# Patient Record
Sex: FEMALE | Race: WHITE
[De-identification: names, ages, dates, MRNs, and addresses within clinical notes are randomized per-mention and may not be internally consistent; named-entity substitution may affect disease eponyms.]

---

## 2018-06-16 ENCOUNTER — HOSPITAL ENCOUNTER (INPATIENT)
Dept: HOSPITAL 17 - PHED | Age: 83
LOS: 3 days | Discharge: HOME | DRG: 872 | End: 2018-06-19
Attending: HOSPITALIST | Admitting: HOSPITALIST
Payer: MEDICARE

## 2018-06-16 VITALS
RESPIRATION RATE: 20 BRPM | DIASTOLIC BLOOD PRESSURE: 42 MMHG | HEART RATE: 82 BPM | OXYGEN SATURATION: 95 % | SYSTOLIC BLOOD PRESSURE: 103 MMHG

## 2018-06-16 VITALS
HEART RATE: 88 BPM | RESPIRATION RATE: 18 BRPM | DIASTOLIC BLOOD PRESSURE: 35 MMHG | TEMPERATURE: 98.5 F | OXYGEN SATURATION: 98 % | SYSTOLIC BLOOD PRESSURE: 87 MMHG

## 2018-06-16 VITALS
HEART RATE: 105 BPM | SYSTOLIC BLOOD PRESSURE: 112 MMHG | RESPIRATION RATE: 18 BRPM | OXYGEN SATURATION: 96 % | TEMPERATURE: 99.5 F | DIASTOLIC BLOOD PRESSURE: 48 MMHG

## 2018-06-16 VITALS — OXYGEN SATURATION: 98 %

## 2018-06-16 VITALS
DIASTOLIC BLOOD PRESSURE: 41 MMHG | RESPIRATION RATE: 13 BRPM | OXYGEN SATURATION: 98 % | HEART RATE: 84 BPM | SYSTOLIC BLOOD PRESSURE: 95 MMHG

## 2018-06-16 VITALS
OXYGEN SATURATION: 94 % | RESPIRATION RATE: 16 BRPM | SYSTOLIC BLOOD PRESSURE: 104 MMHG | DIASTOLIC BLOOD PRESSURE: 50 MMHG | HEART RATE: 89 BPM

## 2018-06-16 VITALS
SYSTOLIC BLOOD PRESSURE: 85 MMHG | HEART RATE: 82 BPM | DIASTOLIC BLOOD PRESSURE: 44 MMHG | OXYGEN SATURATION: 95 % | RESPIRATION RATE: 22 BRPM

## 2018-06-16 VITALS
HEART RATE: 93 BPM | DIASTOLIC BLOOD PRESSURE: 49 MMHG | SYSTOLIC BLOOD PRESSURE: 100 MMHG | RESPIRATION RATE: 16 BRPM | OXYGEN SATURATION: 96 %

## 2018-06-16 VITALS — HEART RATE: 90 BPM

## 2018-06-16 VITALS
DIASTOLIC BLOOD PRESSURE: 38 MMHG | RESPIRATION RATE: 15 BRPM | TEMPERATURE: 98.5 F | SYSTOLIC BLOOD PRESSURE: 84 MMHG | HEART RATE: 86 BPM | OXYGEN SATURATION: 98 %

## 2018-06-16 VITALS
HEART RATE: 86 BPM | OXYGEN SATURATION: 94 % | SYSTOLIC BLOOD PRESSURE: 98 MMHG | DIASTOLIC BLOOD PRESSURE: 44 MMHG | RESPIRATION RATE: 20 BRPM

## 2018-06-16 VITALS
OXYGEN SATURATION: 96 % | DIASTOLIC BLOOD PRESSURE: 39 MMHG | HEART RATE: 88 BPM | SYSTOLIC BLOOD PRESSURE: 89 MMHG | RESPIRATION RATE: 21 BRPM

## 2018-06-16 VITALS — HEART RATE: 86 BPM

## 2018-06-16 VITALS
DIASTOLIC BLOOD PRESSURE: 43 MMHG | TEMPERATURE: 99.9 F | RESPIRATION RATE: 18 BRPM | SYSTOLIC BLOOD PRESSURE: 89 MMHG | HEART RATE: 90 BPM | OXYGEN SATURATION: 94 %

## 2018-06-16 VITALS — HEART RATE: 85 BPM

## 2018-06-16 VITALS
DIASTOLIC BLOOD PRESSURE: 43 MMHG | SYSTOLIC BLOOD PRESSURE: 87 MMHG | OXYGEN SATURATION: 94 % | HEART RATE: 82 BPM | RESPIRATION RATE: 21 BRPM

## 2018-06-16 VITALS
SYSTOLIC BLOOD PRESSURE: 102 MMHG | OXYGEN SATURATION: 93 % | DIASTOLIC BLOOD PRESSURE: 48 MMHG | HEART RATE: 95 BPM | RESPIRATION RATE: 16 BRPM

## 2018-06-16 VITALS
DIASTOLIC BLOOD PRESSURE: 49 MMHG | RESPIRATION RATE: 16 BRPM | OXYGEN SATURATION: 94 % | HEART RATE: 96 BPM | TEMPERATURE: 98.7 F | SYSTOLIC BLOOD PRESSURE: 96 MMHG

## 2018-06-16 VITALS — OXYGEN SATURATION: 95 %

## 2018-06-16 VITALS — WEIGHT: 150.8 LBS | HEIGHT: 65 IN | BODY MASS INDEX: 25.12 KG/M2

## 2018-06-16 VITALS — HEART RATE: 83 BPM

## 2018-06-16 DIAGNOSIS — N17.9: ICD-10-CM

## 2018-06-16 DIAGNOSIS — I27.20: ICD-10-CM

## 2018-06-16 DIAGNOSIS — Z85.43: ICD-10-CM

## 2018-06-16 DIAGNOSIS — K57.30: ICD-10-CM

## 2018-06-16 DIAGNOSIS — I25.10: ICD-10-CM

## 2018-06-16 DIAGNOSIS — A41.51: Primary | ICD-10-CM

## 2018-06-16 DIAGNOSIS — E78.5: ICD-10-CM

## 2018-06-16 DIAGNOSIS — Z96.643: ICD-10-CM

## 2018-06-16 DIAGNOSIS — N39.0: ICD-10-CM

## 2018-06-16 DIAGNOSIS — D50.9: ICD-10-CM

## 2018-06-16 DIAGNOSIS — Z95.1: ICD-10-CM

## 2018-06-16 DIAGNOSIS — I08.3: ICD-10-CM

## 2018-06-16 DIAGNOSIS — I10: ICD-10-CM

## 2018-06-16 DIAGNOSIS — Z96.653: ICD-10-CM

## 2018-06-16 DIAGNOSIS — R65.20: ICD-10-CM

## 2018-06-16 LAB
% SATURATION IRON PROFILE: 3.8 % (ref 20–50)
ALBUMIN SERPL-MCNC: 3.5 GM/DL (ref 3.4–5)
ALP SERPL-CCNC: 59 U/L (ref 45–117)
ALT SERPL-CCNC: 21 U/L (ref 10–53)
AST SERPL-CCNC: 25 U/L (ref 15–37)
BACTERIA #/AREA URNS HPF: (no result) /HPF
BASOPHILS # BLD AUTO: 0 TH/MM3 (ref 0–0.2)
BASOPHILS NFR BLD: 0.2 % (ref 0–2)
BILIRUB SERPL-MCNC: 1.4 MG/DL (ref 0.2–1)
BUN SERPL-MCNC: 37 MG/DL (ref 7–18)
CALCIUM SERPL-MCNC: 9 MG/DL (ref 8.5–10.1)
CHLORIDE SERPL-SCNC: 104 MEQ/L (ref 98–107)
COLOR UR: YELLOW
CREAT SERPL-MCNC: 1.3 MG/DL (ref 0.5–1)
EOSINOPHIL # BLD: 0 TH/MM3 (ref 0–0.4)
EOSINOPHIL NFR BLD: 0 % (ref 0–4)
ERYTHROCYTE [DISTWIDTH] IN BLOOD BY AUTOMATED COUNT: 13.2 % (ref 11.6–17.2)
FERRITIN SERPL-MCNC: 270 NG/ML (ref 8–252)
GFR SERPLBLD BASED ON 1.73 SQ M-ARVRAT: 39 ML/MIN (ref 89–?)
GLUCOSE SERPL-MCNC: 151 MG/DL (ref 74–106)
GLUCOSE UR STRIP-MCNC: (no result) MG/DL
HCO3 BLD-SCNC: 22.2 MEQ/L (ref 21–32)
HCT VFR BLD CALC: 26.8 % (ref 35–46)
HGB BLD-MCNC: 9 GM/DL (ref 11.6–15.3)
HGB UR QL STRIP: (no result)
INR PPP: 1.1 RATIO
IRON (FE): 10 MCG/DL (ref 50–170)
KETONES UR STRIP-MCNC: (no result) MG/DL
LYMPHOCYTES # BLD AUTO: 0.3 TH/MM3 (ref 1–4.8)
LYMPHOCYTES NFR BLD AUTO: 2.2 % (ref 9–44)
MAGNESIUM SERPL-MCNC: 2 MG/DL (ref 1.5–2.5)
MCH RBC QN AUTO: 31.6 PG (ref 27–34)
MCHC RBC AUTO-ENTMCNC: 33.4 % (ref 32–36)
MCV RBC AUTO: 94.5 FL (ref 80–100)
MONOCYTE #: 1.4 TH/MM3 (ref 0–0.9)
MONOCYTES NFR BLD: 9.7 % (ref 0–8)
NEUTROPHILS # BLD AUTO: 12.9 TH/MM3 (ref 1.8–7.7)
NEUTROPHILS NFR BLD AUTO: 87.9 % (ref 16–70)
NITRITE UR QL STRIP: (no result)
PHOSPHATE SERPL-MCNC: 2 MG/DL (ref 2.5–4.9)
PLATELET # BLD: 226 TH/MM3 (ref 150–450)
PMV BLD AUTO: 8.4 FL (ref 7–11)
PROT SERPL-MCNC: 6.8 GM/DL (ref 6.4–8.2)
PROTHROMBIN TIME: 11.4 SEC (ref 9.8–11.6)
RBC # BLD AUTO: 2.84 MIL/MM3 (ref 4–5.3)
SODIUM SERPL-SCNC: 138 MEQ/L (ref 136–145)
SP GR UR STRIP: 1.01 (ref 1–1.03)
SQUAMOUS #/AREA URNS HPF: (no result) /HPF (ref 0–5)
TIBC SERPL-MCNC: 266 MCG/DL (ref 250–450)
URINE LEUKOCYTE ESTERASE: (no result)
WBC # BLD AUTO: 14.6 TH/MM3 (ref 4–11)
WHITE BLOOD CELL CLUMPS: (no result)

## 2018-06-16 PROCEDURE — 83735 ASSAY OF MAGNESIUM: CPT

## 2018-06-16 PROCEDURE — 74177 CT ABD & PELVIS W/CONTRAST: CPT

## 2018-06-16 PROCEDURE — 81001 URINALYSIS AUTO W/SCOPE: CPT

## 2018-06-16 PROCEDURE — 86850 RBC ANTIBODY SCREEN: CPT

## 2018-06-16 PROCEDURE — 36430 TRANSFUSION BLD/BLD COMPNT: CPT

## 2018-06-16 PROCEDURE — 80053 COMPREHEN METABOLIC PANEL: CPT

## 2018-06-16 PROCEDURE — 93306 TTE W/DOPPLER COMPLETE: CPT

## 2018-06-16 PROCEDURE — 85730 THROMBOPLASTIN TIME PARTIAL: CPT

## 2018-06-16 PROCEDURE — 76775 US EXAM ABDO BACK WALL LIM: CPT

## 2018-06-16 PROCEDURE — 86901 BLOOD TYPING SEROLOGIC RH(D): CPT

## 2018-06-16 PROCEDURE — 71045 X-RAY EXAM CHEST 1 VIEW: CPT

## 2018-06-16 PROCEDURE — 82728 ASSAY OF FERRITIN: CPT

## 2018-06-16 PROCEDURE — 84443 ASSAY THYROID STIM HORMONE: CPT

## 2018-06-16 PROCEDURE — 85025 COMPLETE CBC W/AUTO DIFF WBC: CPT

## 2018-06-16 PROCEDURE — 83550 IRON BINDING TEST: CPT

## 2018-06-16 PROCEDURE — 87040 BLOOD CULTURE FOR BACTERIA: CPT

## 2018-06-16 PROCEDURE — 87077 CULTURE AEROBIC IDENTIFY: CPT

## 2018-06-16 PROCEDURE — 93005 ELECTROCARDIOGRAM TRACING: CPT

## 2018-06-16 PROCEDURE — 85610 PROTHROMBIN TIME: CPT

## 2018-06-16 PROCEDURE — 86900 BLOOD TYPING SEROLOGIC ABO: CPT

## 2018-06-16 PROCEDURE — 83605 ASSAY OF LACTIC ACID: CPT

## 2018-06-16 PROCEDURE — P9016 RBC LEUKOCYTES REDUCED: HCPCS

## 2018-06-16 PROCEDURE — 87186 SC STD MICRODIL/AGAR DIL: CPT

## 2018-06-16 PROCEDURE — 87086 URINE CULTURE/COLONY COUNT: CPT

## 2018-06-16 PROCEDURE — 80048 BASIC METABOLIC PNL TOTAL CA: CPT

## 2018-06-16 PROCEDURE — 83880 ASSAY OF NATRIURETIC PEPTIDE: CPT

## 2018-06-16 PROCEDURE — 86920 COMPATIBILITY TEST SPIN: CPT

## 2018-06-16 PROCEDURE — 83540 ASSAY OF IRON: CPT

## 2018-06-16 PROCEDURE — 84100 ASSAY OF PHOSPHORUS: CPT

## 2018-06-16 RX ADMIN — TAZOBACTAM SODIUM AND PIPERACILLIN SODIUM SCH MLS/HR: 250; 2 INJECTION, SOLUTION INTRAVENOUS at 20:57

## 2018-06-16 RX ADMIN — STANDARDIZED SENNA CONCENTRATE AND DOCUSATE SODIUM SCH TAB: 8.6; 5 TABLET, FILM COATED ORAL at 20:54

## 2018-06-16 RX ADMIN — THIAMINE HYDROCHLORIDE SCH MLS/HR: 100 INJECTION, SOLUTION INTRAMUSCULAR; INTRAVENOUS at 18:43

## 2018-06-16 RX ADMIN — Medication SCH ML: at 20:54

## 2018-06-16 NOTE — PD
HPI


Chief Complaint:  General Weakness


Time Seen by Provider:  13:11


Travel History


International Travel<30 days:  No


Contact w/Intl Traveler<30days:  No


Traveled to known affect area:  No





History of Present Illness


HPI


89 y/o female presents with generalized weakness.  She states this morning she 

had an episode of nonbloody vomiting.  She denies any specific pain or other 

concurrent complaints other than feeling off.  In the ambulance she had a 

fever.  She states she did not really she had a fever.  She denies specific 

modifying factors.  She denies recurrent history of this.  Patient is a poor 

historian.





Critical access hospital


Past Medical History


High Cholesterol:  Yes


Coronary Artery Disease:  Yes


Hypertension:  Yes


Influenza Vaccination:  No


Pregnant?:  Not Pregnant





Past Surgical History


Coronary Artery Bypass Graft:  Yes


Joint Replacement:  Yes (KNEE)





Social History


Alcohol Use:  No


Tobacco Use:  No


Substance Use:  No





Allergies-Medications


(Allergen,Severity, Reaction):  


Coded Allergies:  


     No Known Allergies (Unverified , 6/16/18)


Reported Meds & Prescriptions





Reported Meds & Active Scripts


Active


Reported


Aspirin 81 Mg Chew Unknown Dose CHEW DAILY


Simvastatin 5 Mg Tab Unknown Dose PO DAILY


Metoprolol Tartrate 25 Mg Tab Unknown Dose PO BID


Furosemide 20 Mg Tab Unknown Dose PO BID


Tramadol (Tramadol HCl) 50 Mg Tab Unknown Dose PO Q4H PRN








Review of Systems


Except as stated in HPI:  all other systems reviewed are Neg





Physical Exam


Exam Limitations:  Poor Historian


Narrative


GENERAL: 88-year-old female in no apparent distress


SKIN: Focused skin assessment warm/dry.


HEAD: Atraumatic. Normocephalic. 


EYES: Pupils equal and round. No scleral icterus. No injection or drainage. 


ENT: No nasal bleeding or discharge.  Mucous membranes pink and moist.


NECK: Trachea midline.


CARDIOVASCULAR: Regular rate and rhythm.  


RESPIRATORY: No accessory muscle use. Clear to auscultation. Breath sounds 

equal bilaterally. 


GASTROINTESTINAL: Abdomen soft, non-tender, nondistended.


MUSCULOSKELETAL: No obvious deformities. No clubbing.  No cyanosis. 


NEUROLOGICAL: Awake. Moves all extremities. Normal speech.





Data


Data


Last Documented VS





Vital Signs








  Date Time  Temp Pulse Resp B/P (MAP) Pulse Ox O2 Delivery O2 Flow Rate FiO2


 


6/16/18 16:07 98.7 96 16 96/49 (65) 94 Room Air  








Orders





 Orders


Sepsis Workup Initiated (6/16/18 )


Electrocardiogram (6/16/18 13:11)


Complete Blood Count With Diff (6/16/18 13:11)


Comprehensive Metabolic Panel (6/16/18 13:11)


Prothrombin Time / Inr (Pt) (6/16/18 13:11)


Act Partial Throm Time (Ptt) (6/16/18 13:11)


Lactic Acid Sepsis Protocol (6/16/18 13:11)


Magnesium (Mg) (6/16/18 13:11)


Phosphorus (Po4) (6/16/18 13:11)


Urinalysis - C+S If Indicated (6/16/18 13:11)


Blood Culture (6/16/18 13:11)


Chest, Single Ap (6/16/18 13:11)


Ecg Monitoring (6/16/18 13:11)


Iv Access Insert/Monitor (6/16/18 13:11)


Oximetry (6/16/18 13:11)


B-Type Natriuretic Peptide (6/16/18 13:11)


Vancomycin Inj (Vancomycin Inj) (6/16/18 13:18)


Piperacil-Tazo 4.5 Gm Premix (Zosyn 4.5 (6/16/18 13:18)


Sodium Chlor 0.9% 1000 Ml Inj (Ns 1000 M (6/16/18 13:30)


Acetaminophen (Tylenol) (6/16/18 13:30)


Ondansetron  Odt (Zofran  Odt) (6/16/18 13:45)


Acetaminophen (Tylenol) (6/16/18 13:45)


Cath For Specimen (6/16/18 14:20)


Sodium Chlor 0.9% 1000 Ml Inj (Ns 1000 M (6/16/18 14:45)


Urinary Catheter Insert/Apply (6/16/18 14:34)


Ct Abd/Pel W Iv Contrast(Rout) (6/16/18 )


Urine Culture (6/16/18 14:50)


Iodixanol 320 Inj (Rad Ct) (Visipaque 32 (6/16/18 15:41)


Admit Order (Ed Use Only) (6/16/18 16:33)


Place In Observation (6/16/18 )


Vital Signs (Adult) Q4H (6/16/18 16:33)


Cardiac Monitor / Telemetry .CONTINUOUS (6/16/18 16:33)


Diet Heart Healthy (6/16/18 Dinner)


Sodium Chlor 0.45% 1000 Ml Inj (1/2 Ns 1 (6/16/18 16:33)


Sodium Chloride 0.9% Flush (Ns Flush) (6/16/18 16:45)


Sodium Chloride 0.9% Flush (Ns Flush) (6/16/18 21:00)


Acetaminophen (Tylenol) (6/16/18 16:45)


Metoclopramide Inj (Reglan Inj) (6/16/18 16:45)


Comprehensive Metabolic Panel (6/17/18 06:00)


Complete Blood Count With Diff (6/17/18 06:00)


Resp Oxygen Heath C Titrat 1-4 L (6/16/18 )


Pt Request For Service (6/16/18 16:33)


Scd Bilateral/Knee High JESSICA.BID (6/16/18 16:33)


Naloxone Inj (Narcan Inj) (6/16/18 16:45)


Docusate Sodium-Senna (Desire-Colace) (6/16/18 21:00)


Magnesium Hydroxide Liq (Milk Of Magnesi (6/16/18 16:45)


Sennosides (Senokot) (6/16/18 16:45)


Bisacodyl Supp (Dulcolax Supp) (6/16/18 16:45)


Lactulose Liq (Lactulose Liq) (6/16/18 16:45)





Labs





Laboratory Tests








Test


  6/16/18


13:10 6/16/18


13:15 6/16/18


14:50


 


White Blood Count 14.6 TH/MM3   


 


Red Blood Count 2.84 MIL/MM3   


 


Hemoglobin 9.0 GM/DL   


 


Hematocrit 26.8 %   


 


Mean Corpuscular Volume 94.5 FL   


 


Mean Corpuscular Hemoglobin 31.6 PG   


 


Mean Corpuscular Hemoglobin


Concent 33.4 % 


  


  


 


 


Red Cell Distribution Width 13.2 %   


 


Platelet Count 226 TH/MM3   


 


Mean Platelet Volume 8.4 FL   


 


Neutrophils (%) (Auto) 87.9 %   


 


Lymphocytes (%) (Auto) 2.2 %   


 


Monocytes (%) (Auto) 9.7 %   


 


Eosinophils (%) (Auto) 0.0 %   


 


Basophils (%) (Auto) 0.2 %   


 


Neutrophils # (Auto) 12.9 TH/MM3   


 


Lymphocytes # (Auto) 0.3 TH/MM3   


 


Monocytes # (Auto) 1.4 TH/MM3   


 


Eosinophils # (Auto) 0.0 TH/MM3   


 


Basophils # (Auto) 0.0 TH/MM3   


 


CBC Comment DIFF FINAL   


 


Differential Comment    


 


Prothrombin Time 11.4 SEC   


 


Prothromb Time International


Ratio 1.1 RATIO 


  


  


 


 


Activated Partial


Thromboplast Time 24.3 SEC 


  


  


 


 


Blood Urea Nitrogen 37 MG/DL   


 


Creatinine 1.30 MG/DL   


 


Random Glucose 151 MG/DL   


 


Total Protein 6.8 GM/DL   


 


Albumin 3.5 GM/DL   


 


Calcium Level 9.0 MG/DL   


 


Phosphorus Level 2.0 MG/DL   


 


Magnesium Level 2.0 MG/DL   


 


Alkaline Phosphatase 59 U/L   


 


Aspartate Amino Transf


(AST/SGOT) 25 U/L 


  


  


 


 


Alanine Aminotransferase


(ALT/SGPT) 21 U/L 


  


  


 


 


Total Bilirubin 1.4 MG/DL   


 


Sodium Level 138 MEQ/L   


 


Potassium Level 3.5 MEQ/L   


 


Chloride Level 104 MEQ/L   


 


Carbon Dioxide Level 22.2 MEQ/L   


 


Anion Gap 12 MEQ/L   


 


Estimat Glomerular Filtration


Rate 39 ML/MIN 


  


  


 


 


B-Type Natriuretic Peptide 208 PG/ML   


 


Lactic Acid Level  1.4 mmol/L  


 


Urine Collection Type   CATH 


 


Urine Color   YELLOW 


 


Urine Turbidity   CLOUDY 


 


Urine pH   6.0 


 


Urine Specific Gravity   1.015 


 


Urine Protein   100 mg/dL 


 


Urine Glucose (UA)   NEG mg/dL 


 


Urine Ketones   NEG mg/dL 


 


Urine Occult Blood   LARGE 


 


Urine Nitrite   NEG 


 


Urine Bilirubin   NEG 


 


Urine Urobilinogen   0.2 MG/DL 


 


Urine Leukocyte Esterase   SMALL 


 


Urine WBC   25-49 /hpf 


 


Urine WBC Clumps   MOD 


 


Urine Squamous Epithelial


Cells 


  


  0-2 /hpf 


 


 


Urine Bacteria   MANY /hpf 


 


Microscopic Urinalysis Comment


  


  


  CULTURE


INDICATED











MDM


Medical Decision Making


Medical Screen Exam Complete:  Yes


Emergency Medical Condition:  Yes


Medical Record Reviewed:  Yes (Past history confirmed)


Interpretation(s)


CBC & BMP Diagram


6/16/18 13:10








Total Protein 6.8, Albumin 3.5, Calcium Level 9.0, Phosphorus Level 2.0 L, 

Magnesium Level 2.0, Alkaline Phosphatase 59, Aspartate Amino Transf (AST/SGOT) 

25, Alanine Aminotransferase (ALT/SGPT) 21, Total Bilirubin 1.4 H








Last 24 hours Impressions








Chest X-Ray 6/16/18 1311 Signed





Impressions: 





 CONCLUSION: 





 Cardiomegaly with minimal basilar atelectasis or scarring. No effusion. No pneu





 mothorax.





  





 








Differential Diagnosis


UTI, URI, pneumonia, sepsis


Narrative Course


We will check blood work and dose with fluids and reevaluate





ED workup with mild acute renal failure with sepsis and UTI no prior labs for 

comparison.  Patient given fluids and broad-spectrum antibiotics and her blood 

pressure improved.  CT added on to rule out other emergent process given 

hypotension





ct no acute, will place in icu for close monitoring given hypotension


Critical Care Narrative


Aggregate critical care time was 35 minutes. Time to perform other separately 

billable procedures was not  


included in the critical care time. My time did not include minutes spent 

treating any other patients simultaneously or on  


activities that did not directly contribute to the patient's treatment.  





The services I provided to this patient were to treat and/or prevent clinically 

significant deterioration that could result  


in: Septic shock, death





I provided critical care services requiring my management, as noted below:


Chart data review, documentation time, medication orders and management, vital 

sign assessments/reviewing monitor data,  


ordering and reviewing lab tests, ordering and interpreting/reviewing x-rays 

and diagnostic studies, care of the patient  


and discussion of the patient with the admitting physicians.





Sepsis Criteria


SIRS Criteria (2 or more):  Heart rate over 90, WBC > 90887, < 4000 or > 10% 

bands


Sepsis Criteria (SIRS+source):  Infect source susp/known


Severe Sepsis (+one):  Hypotension


Criteria Outcome:  Meets severe sepsis criteria





Physician Communication


Physician Communication


midlevel with dr harris agrees to admit





Diagnosis





 Primary Impression:  


 Severe sepsis


 Additional Impressions:  


 Renal failure


 Qualified Codes:  N17.9 - Acute kidney failure, unspecified


 UTI (urinary tract infection)


 Qualified Codes:  N39.0 - Urinary tract infection, site not specified


 Anemia


 Qualified Codes:  D64.9 - Anemia, unspecified





Admitting Information


Admitting Physician Requests:  it











Vanessa Flores MD Jun 16, 2018 13:30

## 2018-06-16 NOTE — RADRPT
EXAM DATE:  6/16/2018 3:40 PM EDT

AGE/SEX:        88 years / Female



INDICATIONS:  General weakness. Nausea, vomiting and fever.



CLINICAL DATA:  This is the patient's initial encounter. Patient reports that signs and symptoms have
 been present for 1 day and indicates a pain score of 0/10. 

                                                                          

MEDICAL/SURGICAL HISTORY:       Cardiovascular disease.  Hypertension. CABG.



ORAL CONTRAST:   No oral contrast ingested.



RADIATION DOSE:  11.34 CTDI (mGy)









COMPARISON:      No prior exams available for comparison. 





TECHNIQUE:  Multiple contiguous axial images were obtained through the abdomen and pelvis following b
olus infusion of  50 ml Visipaque 320 (iodixanol)  nonionic water-soluble contrast as a single exam d
ose. No oral contrast ingested.  Using automated exposure control and adjustment of the mA and/or kV 
according to patient size, the radiation dose was kept as low as reasonably achievable to obtain opti
mal diagnostic quality images.



FINDINGS: 

There is minimal basilar atelectasis. Previous sternotomy. Heart size enlarged.



No acute findings in the liver, spleen, adrenals or pancreas. Bilateral renal cysts.



No bowel obstruction. No free air or free fluid. There is colonic diverticulosis without evidence for
 diverticulitis. Bilateral hip replacement. Kwan catheter in the bladder. Advanced degenerative sorto
ge in the lumbar spine with a rotatory scoliosis and previous kyphoplasty at the thoracolumbar juncti
on.

CONCLUSION:

1.  No acute findings. Colonic diverticulosis. Advanced degenerative changes in the spine. No obstruc
tive uropathy or bowel obstruction identified. Previous bilateral hip replacement.



Electronically signed by: Charles Duvall MD  6/16/2018 4:12 PM EDT

## 2018-06-16 NOTE — RADRPT
EXAM DATE:  6/16/2018 1:43 PM EDT

AGE/SEX:        88 years / Female



INDICATIONS:  Short of breath, fever, weakness



CLINICAL DATA:  This is the patient's initial encounter. Patient reports that signs and symptoms have
 been present for 1 day and indicates a pain score of 0/10. 

                                                                          

MEDICAL/SURGICAL HISTORY:       Cerebrovascular disease. CABG.



COMPARISON:      No prior exams available for comparison. 





FINDINGS:  

Minimal basilar atelectasis or scarring. Cardiomegaly. Previous sternotomy. Bilateral shoulder replac
ement.



CONCLUSION: 

Cardiomegaly with minimal basilar atelectasis or scarring. No effusion. No pneumothorax.



Electronically signed by: Charles Duvall MD  6/16/2018 1:49 PM EDT

## 2018-06-16 NOTE — HHI.HP
__________________________________________________





[facility]


Service


[facility]


Primary Care Physician


Unknown


Admission Diagnosis


Sepsis, uti, anemia


Diagnoses:  


(1) Severe sepsis


(2) UTI (urinary tract infection)


(3) Anemia


Chief Complaint:  


Weakness


Travel History


International Travel<30 Days:  No


Contact w/Intl Traveler <30 Da:  No


Traveled to Known Affected Are:  No





Sepsis Criteria


SIRS Criteria (2 or more):  Heart rate over 90, WBC > 31525, < 4000 or > 10% 

bands


Sepsis Criteria (SIRS+source):  Infect source susp/known


Severe Sepsis (+one):  Acute Oliguria/Renal Failure


Criteria Outcome:  Meets sepsis criteria


History of Present Illness


This is a pleasant 88-year-old female patient visiting her son from [place] 

who presented to the ED with generalized weakness.  Patient states that she 

woke up this morning, had presence of chills and violent rigors, subjective 

fever, decreased appetite, nausea, vomiting and generalized weakness.  Son at 

bedside and assisting with medical history.  Supposedly patient was in the 

bathroom this morning, had one bout of emesis as well as inability to stand up 

from toilet due to weakness in her legs.  Prior to this morning's episodes 

patient has been feeling at her baseline, completely independent and able to 

perform all ADLs per self.  Patient does admit to poor appetite today and 

unable to keep anything down without nausea.  Patient denies any recent dysuria

, diarrhea, bloody stools or black stools.  Does follow with the primary care 

physician as well as a cardiologist back home in [place], last seen within the 

month with no changes to her medicines.  Upon presentation patient's hemoglobin 

9.0, patient denies any history of anemia although she vaguely remembers that 

she may have "low blood levels in the past".  Does admit to a history of E. 

coli in her stool in the past.





Past Family Social History


Past Medical History


Hyperlipidemia


CAD with history of CABG


Hypertension


History of ovarian cancer


Past Surgical History


Bilateral hip replacement


Bilateral shoulder repair


Bilateral knee replacement


CABG 4 years ago


Reported Medications


Active


Reported


Aspirin 81 Mg Chew Unknown Dose CHEW DAILY


Simvastatin 5 Mg Tab Unknown Dose PO DAILY


Metoprolol Tartrate 25 Mg Tab Unknown Dose PO BID


Furosemide 20 Mg Tab Unknown Dose PO BID


Tramadol (Tramadol HCl) 50 Mg Tab Unknown Dose PO Q4H PRN


Allergies:  


Coded Allergies:  


     No Known Allergies (Unverified , 18)


Active Ordered Medications





Current Medications








 Medications


  (Trade)  Dose


 Ordered  Sig/Dieudonne


 Route  Start Time


 Stop Time Status Last Admin


 


 Sodium Chloride  1,000 ml @ 


 75 mls/hr  [id number]


 IV  18 16:33


   UNV  


 


 


  (NS Flush)  2 ml  UNSCH  PRN


 IV FLUSH  18 16:45


   UNV  


 


 


  (NS Flush)  2 ml  BID


 IV FLUSH  18 21:00


   UNV  


 


 


  (Tylenol)  650 mg  Q4H  PRN


 PO  18 16:45


   UNV  


 


 


  (Reglan Inj)  5 mg  Q6H  PRN


 IV PUSH  18 16:45


   UNV  


 


 


  (Narcan Inj)  0.4 mg  UNSCH  PRN


 IV PUSH  18 16:45


   UNV  


 


 


  (Desire-Colace)  1 tab  BID


 PO  18 21:00


   UNV  


 


 


  (Milk Of


 Magnesia Liq)  30 ml  Q12H  PRN


 PO  18 16:45


   UNV  


 


 


  (Senokot)  17.2 mg  Q12H  PRN


 PO  18 16:45


   UNV  


 


 


  (Dulcolax Supp)  10 mg  DAILY  PRN


 RECTAL  18 16:45


   UNV  


 


 


  (Lactulose Liq)  30 ml  DAILY  PRN


 PO  18 16:45


   UNV  


 


 


 Vancomycin HCl


 1000 mg/Sodium


 Chloride  250 ml @ 


 250 mls/hr  Q24H


 IV  18 16:45


   UNV  


 


 


 Piperacillin Sod/


 Tazobactam Sod  50 ml @ 


 100 mls/hr  Q6H


 IV  18 16:45


   UNV  


 








Family History


Family history reviewed, noncontributory.


Social History


Patient denies any previous or current tobacco abuse, alcohol or illicit drug 

use.





Physical Exam


Vital Signs





Vital Signs








  Date Time  Temp Pulse Resp B/P (MAP) Pulse Ox O2 Delivery O2 Flow Rate FiO2


 


18 16:45  93 16 100/49 (66) 96   


 


18 16:45      Room Air  


 


18 16:07 98.7 96 16 96/49 (65) 94 Room Air  


 


18 14:55  95 16 102/48 (66) 93 Room Air  


 


18 14:39 99.9 90 18 89/43 (58) 94 Room Air  


 


18 13:23     95   


 


18 13:17 99.5 105 18 112/48 (69) 96   








Physical Exam


GENERAL: Well-developed, well-nourished elderly frail female patient in NAD.


SKIN: Cool and dry. No rash.


HEAD:  Normocephalic. Atraumatic.


EYES: Pupils equal and round. No scleral icterus. No injection or drainage. 


ENT: No nasal bleeding or discharge.  Mucous membranes pink and moist.


NECK: Supple. Trachea midline.  


CARDIOVASCULAR: Sinus tachycardia.  S1, S2 noted. 


RESPIRATORY: No accessory muscle use.  Diminished breath sounds. Breath sounds 

equal bilaterally.  


GASTROINTESTINAL: Abdomen soft, non-tender, nondistended. Normoactive bowel 

sounds x4.


: No CVA tenderness.


MUSCULOSKELETAL: No obvious deformities. Extremities without clubbing, cyanosis

, or edema. 


NEUROLOGICAL: Awake and alert. No obvious cranial nerve deficits.  Motor 

grossly within normal limits. 4/5 muscle strength in bilateral lower 

extremities.  5 out of 5 muscle strength in bilateral upper extremities.  

Normal speech.


PSYCHIATRIC: Appropriate mood and affect; insight and judgment normal.


Laboratory





Laboratory Tests








Test


  18


13:10 18


13:15 18


14:50


 


White Blood Count 14.6   


 


Red Blood Count 2.84   


 


Hemoglobin 9.0   


 


Hematocrit 26.8   


 


Mean Corpuscular Volume 94.5   


 


Mean Corpuscular Hemoglobin 31.6   


 


Mean Corpuscular Hemoglobin


Concent 33.4 


  


  


 


 


Red Cell Distribution Width 13.2   


 


Platelet Count 226   


 


Mean Platelet Volume 8.4   


 


Neutrophils (%) (Auto) 87.9   


 


Lymphocytes (%) (Auto) 2.2   


 


Monocytes (%) (Auto) 9.7   


 


Eosinophils (%) (Auto) 0.0   


 


Basophils (%) (Auto) 0.2   


 


Neutrophils # (Auto) 12.9   


 


Lymphocytes # (Auto) 0.3   


 


Monocytes # (Auto) 1.4   


 


Eosinophils # (Auto) 0.0   


 


Basophils # (Auto) 0.0   


 


CBC Comment DIFF FINAL   


 


Differential Comment    


 


Prothrombin Time 11.4   


 


Prothromb Time International


Ratio 1.1 


  


  


 


 


Activated Partial


Thromboplast Time 24.3 


  


  


 


 


Blood Urea Nitrogen 37   


 


Creatinine 1.30   


 


Random Glucose 151   


 


Total Protein 6.8   


 


Albumin 3.5   


 


Calcium Level 9.0   


 


Phosphorus Level 2.0   


 


Magnesium Level 2.0   


 


Alkaline Phosphatase 59   


 


Aspartate Amino Transf


(AST/SGOT) 25 


  


  


 


 


Alanine Aminotransferase


(ALT/SGPT) 21 


  


  


 


 


Total Bilirubin 1.4   


 


Sodium Level 138   


 


Potassium Level 3.5   


 


Chloride Level 104   


 


Carbon Dioxide Level 22.2   


 


Anion Gap 12   


 


Estimat Glomerular Filtration


Rate 39 


  


  


 


 


B-Type Natriuretic Peptide 208   


 


Lactic Acid Level  1.4  


 


Urine Collection Type   CATH 


 


Urine Color   YELLOW 


 


Urine Turbidity   CLOUDY 


 


Urine pH   6.0 


 


Urine Specific Gravity   1.015 


 


Urine Protein   100 


 


Urine Glucose (UA)   NEG 


 


Urine Ketones   NEG 


 


Urine Occult Blood   LARGE 


 


Urine Nitrite   NEG 


 


Urine Bilirubin   NEG 


 


Urine Urobilinogen   0.2 


 


Urine Leukocyte Esterase   SMALL 


 


Urine WBC   25-49 


 


Urine WBC Clumps   MOD 


 


Urine Squamous Epithelial


Cells 


  


  0-2 


 


 


Urine Bacteria   MANY 


 


Microscopic Urinalysis Comment


  


  


  CULTURE


INDICATED














 Date/Time


Source Procedure


Growth Status


 


 


 18 13:15


Blood Peripheral Aerobic Blood Culture


Pending Received


 


 18 13:15


Blood Peripheral Anaerobic Blood Culture


Pending Received


 


 18 14:50


Urine Catheterized Urine Urine Culture


Pending Received








Result Diagram:  


18 1310                                                                   

             18 1310





Imaging





Last Impressions








Chest X-Ray 18 1311 Signed





Impressions: 





 CONCLUSION: 





 Cardiomegaly with minimal basilar atelectasis or scarring. No effusion. No pneu





 mothorax.





  





 


 


Abdomen/Pelvis CT 18 0000 Signed





Impressions: 





 CONCLUSION:





 1.  No acute findings. Colonic diverticulosis. Advanced degenerative changes in





  the spine. No obstructive uropathy or bowel obstruction identified. Previous b





 ilateral hip replacement.





  





 











Septic Shock Reassessment


Septic shock perfusion:  reassessment completed





Caprini VTE Risk Assessment


Caprini VTE Risk Assessment:  Mod/High Risk (score >= 2)


Caprini Risk Assessment Model











 Point Value = 1          Point Value = 2  Point Value = 3  Point Value = 5


 


Age 41-60


Minor surgery


BMI > 25 kg/m2


Swollen legs


Varicose veins


Pregnancy or postpartum


History of unexplained or recurrent


   spontaneous 


Oral contraceptives or hormone


   replacement


Sepsis (< 1 month)


Serious lung disease, including


   pneumonia (< 1 month)


Abnormal pulmonary function


Acute myocardial infarction


Congestive heart failure (< 1 month)


History of inflammatory bowel disease


Medical patient at bed rest Age 61-74


Arthroscopic surgery


Major open surgery (> 45 min)


Laparoscopic surgery (> 45 min)


Malignancy


Confined to bed (> 72 hours)


Immobilizing plaster cast


Central venous access Age >= 75


History of VTE


Family history of VTE


Factor V Leiden


Prothrombin 12997L


Lupus anticoagulant


Anticardiolipin antibodies


Elevated serum homocysteine


Heparin-induced thrombocytopenia


Other congenital or acquired


   thrombophilia Stroke (< 1 month)


Elective arthroplasty


Hip, pelvis, or leg fracture


Acute spinal cord injury (< 1 month)








Prophylaxis Regimen











   Total Risk


Factor Score Risk Level Prophylaxis Regimen


 


0-1      Low Early ambulation


 


2 Moderate Order ONE of the following:


*Sequential Compression Device (SCD)


*Heparin 5000 units SQ BID


 


3-4 Higher Order ONE of the following medications:


*Heparin 5000 units SQ TID


*Enoxaparin/Lovenox 40 mg SQ daily (WT < 150 kg, CrCl > 30 mL/min)


*Enoxaparin/Lovenox 30 mg SQ daily (WT < 150 kg, CrCl > 10-29 mL/min)


*Enoxaparin/Lovenox 30 mg SQ BID (WT < 150 kg, CrCl > 30 mL/min)


AND/OR


*Sequential Compression Device (SCD)


 


5 or more Highest Order ONE of the following medications:


*Heparin 5000 units SQ TID (Preferred with Epidurals)


*Enoxaparin/Lovenox 40 mg SQ daily (WT < 150 kg, CrCl > 30 mL/min)


*Enoxaparin/Lovenox 30 mg SQ daily (WT < 150 kg, CrCl > 10-29 mL/min)


*Enoxaparin/Lovenox 30 mg SQ BID (WT < 150 kg, CrCl > 30 mL/min)


AND


*Sequential Compression Device (SCD)











Assessment and Plan


Problem List:  


(1) Severe sepsis


ICD Code:  A41.9 - Sepsis, unspecified organism; R65.20 - Severe sepsis without 

septic shock


Status:  Acute


(2) UTI (urinary tract infection)


ICD Code:  N39.0 - Urinary tract infection, site not specified


Status:  Acute


(3) Renal failure


ICD Code:  N19 - Unspecified kidney failure


Status:  Acute


(4) Anemia


ICD Code:  D64.9 - Anemia, unspecified


Status:  Acute


Assessment and Plan


This is a pleasant 88-year-old female patient visiting her son from [place] 

who presented to the ED with generalized weakness.  Patient states that she 

woke up this morning, had presence of chills and violent rigors, subjective 

fever, decreased appetite, nausea, vomiting and generalized weakness.





Severe sepsis


Meet sepsis criteria with leukocytosis, white blood cell 14.6 with bandemia, 

tachycardia and suspected source UTI


Abnormal UA with presence of leukocyte esterase and white blood cells with 

bacteria.


Acute kidney injury suspect secondary to above as well as dehydration/poor p.o. 

intake and vomiting


- Sepsis protocol in place.  Lactic acid 1.4.  Patient given 2 L NS bolus in 

ED.  Will ensure hydration, continue IV fluid.  Monitor for overload.  

Encourage p.o. intake.


- Chest x-ray reviewed showing cardiomegaly with no effusion or pneumothorax.


- Abdomen/pelvis CT reviewed showing colonic diverticulosis.  Degenerative 

changes.  No obstructive uropathy or bowel obstruction.  No acute findings.


- Patient was given Zosyn and vancomycin IV in ED.  Will continue.  Pharmacy 

consult for vancomycin management.


- Blood cultures ordered and pending.  Follow growth.  Low-grade temperatures T-

max 99.9.  Monitor overnight.


- Acetaminophen for fever.


- PT eval ordered, input and recommendations pending.


- No history of kidney disease in the past.  Will hydrate and follow BMP.  

Creatinine 1.3, GFR 39 upon presentation.





Normocytic, normochromic anemia suspect secondary to anemia of chronic disease


- There is questionable history of anemia in the past.  


- Will order iron studies.  Follow.  As well as Hemoccult stool.  Follow.


- Follow H&H in a.m.





Hypertension, chronic: Will hold home medications for now.  Patient is with 

labile blood pressures and hypotension.  Continue to monitor trends.





History of CAD with CABG: Will continue cardiac telemetry, monitor for any 

arrhythmias.





DVT prophylaxis: SCDs.  Will rule out GI bleed, hold chemical prophylaxis for 

now.  Reassess in a.m.


Code Status


Full code


Discussed Condition With


Patient and son. 


Son's number - [phone]





Problem Qualifiers





(1) UTI (urinary tract infection):  


Qualified Codes:  N39.0 - Urinary tract infection, site not specified


(2) Anemia:  


Qualified Codes:  D64.9 - Anemia, unspecified


(3) Renal failure:  


Qualified Codes:  N17.9 - Acute kidney failure, unspecified








Nelly Cat 2018 17:16

## 2018-06-17 VITALS — HEART RATE: 86 BPM

## 2018-06-17 VITALS
SYSTOLIC BLOOD PRESSURE: 89 MMHG | RESPIRATION RATE: 26 BRPM | OXYGEN SATURATION: 100 % | DIASTOLIC BLOOD PRESSURE: 44 MMHG | HEART RATE: 76 BPM

## 2018-06-17 VITALS
DIASTOLIC BLOOD PRESSURE: 52 MMHG | RESPIRATION RATE: 19 BRPM | OXYGEN SATURATION: 100 % | HEART RATE: 76 BPM | SYSTOLIC BLOOD PRESSURE: 109 MMHG

## 2018-06-17 VITALS
SYSTOLIC BLOOD PRESSURE: 103 MMHG | RESPIRATION RATE: 20 BRPM | DIASTOLIC BLOOD PRESSURE: 42 MMHG | HEART RATE: 86 BPM | OXYGEN SATURATION: 95 %

## 2018-06-17 VITALS
RESPIRATION RATE: 23 BRPM | HEART RATE: 93 BPM | DIASTOLIC BLOOD PRESSURE: 52 MMHG | SYSTOLIC BLOOD PRESSURE: 109 MMHG | OXYGEN SATURATION: 100 %

## 2018-06-17 VITALS
DIASTOLIC BLOOD PRESSURE: 62 MMHG | HEART RATE: 86 BPM | OXYGEN SATURATION: 98 % | SYSTOLIC BLOOD PRESSURE: 121 MMHG | RESPIRATION RATE: 20 BRPM

## 2018-06-17 VITALS
OXYGEN SATURATION: 100 % | DIASTOLIC BLOOD PRESSURE: 56 MMHG | RESPIRATION RATE: 23 BRPM | HEART RATE: 86 BPM | SYSTOLIC BLOOD PRESSURE: 115 MMHG

## 2018-06-17 VITALS
SYSTOLIC BLOOD PRESSURE: 117 MMHG | RESPIRATION RATE: 27 BRPM | HEART RATE: 80 BPM | DIASTOLIC BLOOD PRESSURE: 61 MMHG | OXYGEN SATURATION: 100 %

## 2018-06-17 VITALS
OXYGEN SATURATION: 100 % | SYSTOLIC BLOOD PRESSURE: 115 MMHG | RESPIRATION RATE: 30 BRPM | HEART RATE: 64 BPM | DIASTOLIC BLOOD PRESSURE: 57 MMHG

## 2018-06-17 VITALS
SYSTOLIC BLOOD PRESSURE: 102 MMHG | RESPIRATION RATE: 16 BRPM | HEART RATE: 78 BPM | DIASTOLIC BLOOD PRESSURE: 49 MMHG | OXYGEN SATURATION: 100 %

## 2018-06-17 VITALS
SYSTOLIC BLOOD PRESSURE: 104 MMHG | RESPIRATION RATE: 18 BRPM | DIASTOLIC BLOOD PRESSURE: 48 MMHG | OXYGEN SATURATION: 100 % | HEART RATE: 76 BPM

## 2018-06-17 VITALS
HEART RATE: 86 BPM | SYSTOLIC BLOOD PRESSURE: 121 MMHG | OXYGEN SATURATION: 100 % | RESPIRATION RATE: 25 BRPM | DIASTOLIC BLOOD PRESSURE: 59 MMHG

## 2018-06-17 VITALS
HEART RATE: 78 BPM | DIASTOLIC BLOOD PRESSURE: 55 MMHG | SYSTOLIC BLOOD PRESSURE: 110 MMHG | OXYGEN SATURATION: 100 % | RESPIRATION RATE: 20 BRPM

## 2018-06-17 VITALS
DIASTOLIC BLOOD PRESSURE: 60 MMHG | SYSTOLIC BLOOD PRESSURE: 134 MMHG | TEMPERATURE: 98.9 F | HEART RATE: 84 BPM | RESPIRATION RATE: 24 BRPM | OXYGEN SATURATION: 100 %

## 2018-06-17 VITALS
SYSTOLIC BLOOD PRESSURE: 113 MMHG | DIASTOLIC BLOOD PRESSURE: 52 MMHG | RESPIRATION RATE: 21 BRPM | OXYGEN SATURATION: 100 % | HEART RATE: 80 BPM

## 2018-06-17 VITALS
RESPIRATION RATE: 21 BRPM | SYSTOLIC BLOOD PRESSURE: 117 MMHG | OXYGEN SATURATION: 97 % | DIASTOLIC BLOOD PRESSURE: 54 MMHG | HEART RATE: 84 BPM | TEMPERATURE: 98 F

## 2018-06-17 VITALS
RESPIRATION RATE: 20 BRPM | HEART RATE: 82 BPM | DIASTOLIC BLOOD PRESSURE: 46 MMHG | OXYGEN SATURATION: 100 % | SYSTOLIC BLOOD PRESSURE: 109 MMHG

## 2018-06-17 VITALS — RESPIRATION RATE: 29 BRPM | DIASTOLIC BLOOD PRESSURE: 59 MMHG | SYSTOLIC BLOOD PRESSURE: 154 MMHG | HEART RATE: 94 BPM

## 2018-06-17 VITALS — DIASTOLIC BLOOD PRESSURE: 50 MMHG | HEART RATE: 80 BPM | RESPIRATION RATE: 26 BRPM | SYSTOLIC BLOOD PRESSURE: 98 MMHG

## 2018-06-17 VITALS
HEART RATE: 84 BPM | DIASTOLIC BLOOD PRESSURE: 61 MMHG | RESPIRATION RATE: 25 BRPM | SYSTOLIC BLOOD PRESSURE: 136 MMHG | OXYGEN SATURATION: 100 %

## 2018-06-17 VITALS — HEART RATE: 98 BPM

## 2018-06-17 VITALS
RESPIRATION RATE: 9 BRPM | SYSTOLIC BLOOD PRESSURE: 97 MMHG | OXYGEN SATURATION: 100 % | DIASTOLIC BLOOD PRESSURE: 51 MMHG | HEART RATE: 78 BPM

## 2018-06-17 VITALS
DIASTOLIC BLOOD PRESSURE: 64 MMHG | OXYGEN SATURATION: 100 % | HEART RATE: 86 BPM | SYSTOLIC BLOOD PRESSURE: 137 MMHG | RESPIRATION RATE: 25 BRPM

## 2018-06-17 VITALS — HEART RATE: 87 BPM | TEMPERATURE: 98 F

## 2018-06-17 VITALS — HEART RATE: 90 BPM

## 2018-06-17 VITALS
HEART RATE: 76 BPM | SYSTOLIC BLOOD PRESSURE: 107 MMHG | RESPIRATION RATE: 22 BRPM | OXYGEN SATURATION: 100 % | DIASTOLIC BLOOD PRESSURE: 51 MMHG

## 2018-06-17 VITALS
HEART RATE: 78 BPM | RESPIRATION RATE: 25 BRPM | SYSTOLIC BLOOD PRESSURE: 94 MMHG | DIASTOLIC BLOOD PRESSURE: 48 MMHG | TEMPERATURE: 98.2 F

## 2018-06-17 VITALS — TEMPERATURE: 98.4 F

## 2018-06-17 VITALS
OXYGEN SATURATION: 100 % | RESPIRATION RATE: 27 BRPM | DIASTOLIC BLOOD PRESSURE: 55 MMHG | SYSTOLIC BLOOD PRESSURE: 93 MMHG | HEART RATE: 86 BPM

## 2018-06-17 VITALS
SYSTOLIC BLOOD PRESSURE: 87 MMHG | HEART RATE: 76 BPM | RESPIRATION RATE: 21 BRPM | OXYGEN SATURATION: 100 % | DIASTOLIC BLOOD PRESSURE: 43 MMHG

## 2018-06-17 VITALS
OXYGEN SATURATION: 100 % | TEMPERATURE: 98.3 F | SYSTOLIC BLOOD PRESSURE: 102 MMHG | HEART RATE: 77 BPM | DIASTOLIC BLOOD PRESSURE: 49 MMHG | RESPIRATION RATE: 22 BRPM

## 2018-06-17 VITALS
SYSTOLIC BLOOD PRESSURE: 181 MMHG | OXYGEN SATURATION: 100 % | HEART RATE: 96 BPM | DIASTOLIC BLOOD PRESSURE: 100 MMHG | RESPIRATION RATE: 22 BRPM

## 2018-06-17 VITALS
SYSTOLIC BLOOD PRESSURE: 103 MMHG | RESPIRATION RATE: 20 BRPM | OXYGEN SATURATION: 100 % | DIASTOLIC BLOOD PRESSURE: 50 MMHG | HEART RATE: 80 BPM

## 2018-06-17 VITALS
SYSTOLIC BLOOD PRESSURE: 129 MMHG | DIASTOLIC BLOOD PRESSURE: 57 MMHG | OXYGEN SATURATION: 100 % | RESPIRATION RATE: 23 BRPM | HEART RATE: 84 BPM

## 2018-06-17 VITALS
HEART RATE: 78 BPM | DIASTOLIC BLOOD PRESSURE: 53 MMHG | SYSTOLIC BLOOD PRESSURE: 111 MMHG | RESPIRATION RATE: 21 BRPM | OXYGEN SATURATION: 100 %

## 2018-06-17 VITALS
SYSTOLIC BLOOD PRESSURE: 94 MMHG | DIASTOLIC BLOOD PRESSURE: 48 MMHG | HEART RATE: 78 BPM | RESPIRATION RATE: 27 BRPM | OXYGEN SATURATION: 100 %

## 2018-06-17 VITALS — HEART RATE: 91 BPM

## 2018-06-17 VITALS — HEART RATE: 85 BPM

## 2018-06-17 VITALS — HEART RATE: 87 BPM

## 2018-06-17 VITALS — HEART RATE: 93 BPM

## 2018-06-17 VITALS — OXYGEN SATURATION: 99 %

## 2018-06-17 VITALS — HEART RATE: 88 BPM

## 2018-06-17 VITALS — HEART RATE: 83 BPM

## 2018-06-17 VITALS — HEART RATE: 80 BPM

## 2018-06-17 VITALS — HEART RATE: 77 BPM

## 2018-06-17 LAB
ALBUMIN SERPL-MCNC: 2.6 GM/DL (ref 3.4–5)
ALP SERPL-CCNC: 58 U/L (ref 45–117)
ALT SERPL-CCNC: 66 U/L (ref 10–53)
AST SERPL-CCNC: 71 U/L (ref 15–37)
BASOPHILS # BLD AUTO: 0.1 TH/MM3 (ref 0–0.2)
BASOPHILS NFR BLD: 0.7 % (ref 0–2)
BILIRUB SERPL-MCNC: 0.6 MG/DL (ref 0.2–1)
BUN SERPL-MCNC: 32 MG/DL (ref 7–18)
CALCIUM SERPL-MCNC: 7.5 MG/DL (ref 8.5–10.1)
CHLORIDE SERPL-SCNC: 109 MEQ/L (ref 98–107)
CREAT SERPL-MCNC: 1.3 MG/DL (ref 0.5–1)
EOSINOPHIL # BLD: 0.1 TH/MM3 (ref 0–0.4)
EOSINOPHIL NFR BLD: 1.3 % (ref 0–4)
ERYTHROCYTE [DISTWIDTH] IN BLOOD BY AUTOMATED COUNT: 13.4 % (ref 11.6–17.2)
GFR SERPLBLD BASED ON 1.73 SQ M-ARVRAT: 39 ML/MIN (ref 89–?)
GLUCOSE SERPL-MCNC: 83 MG/DL (ref 74–106)
HCO3 BLD-SCNC: 22.4 MEQ/L (ref 21–32)
HCT VFR BLD CALC: 22.3 % (ref 35–46)
HGB BLD-MCNC: 7.6 GM/DL (ref 11.6–15.3)
LYMPHOCYTES # BLD AUTO: 1.1 TH/MM3 (ref 1–4.8)
LYMPHOCYTES NFR BLD AUTO: 11.5 % (ref 9–44)
MCH RBC QN AUTO: 32.3 PG (ref 27–34)
MCHC RBC AUTO-ENTMCNC: 34 % (ref 32–36)
MCV RBC AUTO: 95.1 FL (ref 80–100)
MONOCYTE #: 1.3 TH/MM3 (ref 0–0.9)
MONOCYTES NFR BLD: 12.8 % (ref 0–8)
NEUTROPHILS # BLD AUTO: 7.2 TH/MM3 (ref 1.8–7.7)
NEUTROPHILS NFR BLD AUTO: 73.7 % (ref 16–70)
PHOSPHATE SERPL-MCNC: 4.3 MG/DL (ref 2.5–4.9)
PLATELET # BLD: 177 TH/MM3 (ref 150–450)
PMV BLD AUTO: 8.7 FL (ref 7–11)
PROT SERPL-MCNC: 5.6 GM/DL (ref 6.4–8.2)
RBC # BLD AUTO: 2.35 MIL/MM3 (ref 4–5.3)
SODIUM SERPL-SCNC: 140 MEQ/L (ref 136–145)
WBC # BLD AUTO: 9.8 TH/MM3 (ref 4–11)

## 2018-06-17 PROCEDURE — 30233N1 TRANSFUSION OF NONAUTOLOGOUS RED BLOOD CELLS INTO PERIPHERAL VEIN, PERCUTANEOUS APPROACH: ICD-10-PCS | Performed by: HOSPITALIST

## 2018-06-17 RX ADMIN — STANDARDIZED SENNA CONCENTRATE AND DOCUSATE SODIUM SCH TAB: 8.6; 5 TABLET, FILM COATED ORAL at 08:02

## 2018-06-17 RX ADMIN — TAZOBACTAM SODIUM AND PIPERACILLIN SODIUM SCH MLS/HR: 250; 2 INJECTION, SOLUTION INTRAVENOUS at 15:51

## 2018-06-17 RX ADMIN — SODIUM CHLORIDE SCH MLS/HR: 900 INJECTION, SOLUTION INTRAVENOUS at 10:33

## 2018-06-17 RX ADMIN — THIAMINE HYDROCHLORIDE SCH MLS/HR: 100 INJECTION, SOLUTION INTRAMUSCULAR; INTRAVENOUS at 05:25

## 2018-06-17 RX ADMIN — TAZOBACTAM SODIUM AND PIPERACILLIN SODIUM SCH MLS/HR: 250; 2 INJECTION, SOLUTION INTRAVENOUS at 07:51

## 2018-06-17 RX ADMIN — STANDARDIZED SENNA CONCENTRATE AND DOCUSATE SODIUM SCH TAB: 8.6; 5 TABLET, FILM COATED ORAL at 21:03

## 2018-06-17 RX ADMIN — TAZOBACTAM SODIUM AND PIPERACILLIN SODIUM SCH MLS/HR: 250; 2 INJECTION, SOLUTION INTRAVENOUS at 01:40

## 2018-06-17 RX ADMIN — THIAMINE HYDROCHLORIDE SCH MLS/HR: 100 INJECTION, SOLUTION INTRAMUSCULAR; INTRAVENOUS at 10:14

## 2018-06-17 RX ADMIN — TAZOBACTAM SODIUM AND PIPERACILLIN SODIUM SCH MLS/HR: 250; 2 INJECTION, SOLUTION INTRAVENOUS at 19:40

## 2018-06-17 RX ADMIN — Medication SCH ML: at 21:04

## 2018-06-17 RX ADMIN — Medication SCH ML: at 08:02

## 2018-06-17 NOTE — RADRPT
EXAM DATE:  6/17/2018 3:00 PM EDT

AGE/SEX:        88 years / Female



INDICATIONS:  Increased BUN/Creatinine.



CLINICAL DATA:  This is the patient's initial encounter. Patient reports that signs and symptoms have
 been present for 1 day and indicates a pain score of 0/10. 

                                                                          

MEDICAL/SURGICAL HISTORY:       Hypercholesterolemia.  Gastroesophageal reflux disease.  Hypertension
.  Hearing loss. Coronary artery disease. Ulcer. Arthritis. Uterine cancer. Chemotherapy. Osteoporosi
s. Blood transfusion. Sepsis. Acute renal failure.  CABG.  Hysterectomy. Bilateral hip replacement. B
ilateral knee replacement. Bilateral shoulder repair.



COMPARISON:      No prior exams available for comparison. 





MEASUREMENTS:

Right Kidney:__10.1 x 4.9 x 4.7 cm   cm

Left Kidney:__11.1 x 5.6 x 5.1 cm  cm



FINDINGS: Small bilateral pleural effusions.

Right Kidney: No evidence of mass or hydronephrosis. 1.5 cm simple cyst cortex of the lower pole.

Left Kidney: No evidence of hydronephrosis. Dominant cystic lesion in the midpole measuring 3.9 x 4.3
 x 2.9 cm and demonstrates through transmission and is hypoechoic.

Bladder: Kwan catheter within a nondistended bladder. 



CONCLUSION: 

1.  Bilateral renal cysts. No solid lesions or hydronephrosis.



Electronically signed by: Son Guevara MD  6/17/2018 3:16 PM EDT

## 2018-06-17 NOTE — HHI.PR
Subjective


Remarks


Patient seen in follow-up for sepsis secondary to urinary tract infection.  

Blood pressure improved.  Patient feels a bit stronger.  Still awaiting 

cultures.  Care plan discussed with ICU RN


Patient complaining of some shortness of breath, oxygenation respiratory rate 

great





Objective


Vitals





Vital Signs








  Date Time  Temp Pulse Resp B/P (MAP) Pulse Ox O2 Delivery O2 Flow Rate FiO2


 


6/17/18 06:00  86      


 


6/17/18 05:00  80      


 


6/17/18 04:00  83      


 


6/17/18 03:01  86 20 103/42 (62) 95   


 


6/17/18 03:00  86      


 


6/17/18 02:01  86 20 121/62 (81) 98   


 


6/17/18 02:00  87      


 


6/17/18 01:00  87      


 


6/17/18 00:01 98.0 84 21 117/54 (75) 97   


 


6/17/18 00:00  85      


 


6/16/18 23:01  82 20 103/42 (62) 95   


 


6/16/18 23:00  85      


 


6/16/18 22:03  82 21 87/43 (58) 94   


 


6/16/18 22:01  82 22 85/44 (58) 95   


 


6/16/18 22:00  83      


 


6/16/18 21:01  86 20 98/44 (62) 94   


 


6/16/18 21:00  83      


 


6/16/18 20:34     98   21


 


6/16/18 20:05  88 21 89/39 (56) 96   


 


6/16/18 20:04 98.5 88 18 87/35 (52) 98   


 


6/16/18 20:00  90      


 


6/16/18 19:00  90      


 


6/16/18 18:01  84 13 95/41 (59) 98   


 


6/16/18 18:01  84      


 


6/16/18 18:00  86      


 


6/16/18 17:59 98.5 86 15 84/38 (53) 98   


 


6/16/18 17:59  86      


 


6/16/18 17:46        


 


6/16/18 17:26  89 16 104/50 (68) 94 Room Air  


 


6/16/18 16:45  93 16 100/49 (66) 96   


 


6/16/18 16:45      Room Air  


 


6/16/18 16:07 98.7 96 16 96/49 (65) 94 Room Air  


 


6/16/18 14:55  95 16 102/48 (66) 93 Room Air  


 


6/16/18 14:39 99.9 90 18 89/43 (58) 94 Room Air  


 


6/16/18 13:23     95   


 


6/16/18 13:17 99.5 105 18 112/48 (69) 96   














I/O      


 


 6/16/18 6/16/18 6/16/18 6/17/18 6/17/18 6/17/18





 07:00 15:00 23:00 07:00 15:00 23:00


 


Intake Total  1100 ml 1590 ml 3967 ml  


 


Output Total   450 ml 725 ml  


 


Balance  1100 ml 1140 ml 3242 ml  


 


      


 


Intake Oral   340 ml 480 ml  


 


IV Total  1100 ml 1250 ml 3487 ml  


 


Output Urine Total   450 ml 725 ml  


 


# Bowel Movements   0 0  








Result Diagram:  


6/17/18 0545                                                                   

             6/17/18 0545





Imaging





Last Impressions








Chest X-Ray 6/16/18 1311 Signed





Impressions: 





 CONCLUSION: 





 Cardiomegaly with minimal basilar atelectasis or scarring. No effusion. No pneu





 mothorax.





  





 


 


Abdomen/Pelvis CT 6/16/18 0000 Signed





Impressions: 





 CONCLUSION:





 1.  No acute findings. Colonic diverticulosis. Advanced degenerative changes in





  the spine. No obstructive uropathy or bowel obstruction identified. Previous b





 ilateral hip replacement.





  





 








Objective Remarks


GENERAL: This is a well-nourished, well-developed patient, in no apparent 

distress.


CARDIOVASCULAR: Regular rate and rhythm with systolic murmur


RESPIRATORY: Clear to auscultation. Breath sounds equal bilaterally. No wheezes

, rales, or rhonchi.  


GASTROINTESTINAL: Abdomen soft, non-tender, nondistended. Normal active bowel 

sounds


MUSCULOSKELETAL: Extremities without clubbing, cyanosis, or edema.


NEURO:  Alert & Oriented x4 to person, place, time, situation.  Moves all ext x4





A/P


Problem List:  


(1) Severe sepsis


ICD Code:  A41.9 - Sepsis, unspecified organism; R65.20 - Severe sepsis without 

septic shock


Status:  Acute


Plan:  Secondary to urinary tract infection


Patient with leukocytosis and hypotension and acute kidney injury


Continue treating infection and follow-up cultures


Continue empiric vancomycin and Zosyn





(2) UTI (urinary tract infection)


ICD Code:  N39.0 - Urinary tract infection, site not specified


Status:  Acute


(3) Renal failure


ICD Code:  N19 - Unspecified kidney failure


Status:  Acute


Plan:  This appears to be acute kidney injury probably due to urinary tract 

infection versus dehydration.  Patient also hypotensive and may have some 

prerenal azotemia


Continue to follow


Renal ultrasound pending





(4) Anemia


ICD Code:  D64.9 - Anemia, unspecified


Status:  Acute


Plan:  Severe iron deficiency anemia


IV iron x3d


Acute without evidence of acute blood loss


Hemoglobin 7.6 this morning and patient still quite hypotensive


We will transfuse 1 unit and follow clinically





(5) Murmur, cardiac


ICD Code:  R01.1 - Cardiac murmur, unspecified


Plan:  Patient with a history of coronary disease, follow-up echocardiogram


Follow-up blood cultures


Patient does not recall a murmur and her history





Discharge Planning


Home 1-2 days pending progress





Problem Qualifiers





(1) UTI (urinary tract infection):  


Qualified Codes:  N39.0 - Urinary tract infection, site not specified


(2) Renal failure:  


Qualified Codes:  N17.9 - Acute kidney failure, unspecified


(3) Anemia:  


Qualified Codes:  D64.9 - Anemia, unspecified








Lori Freed MD Jun 17, 2018 08:19

## 2018-06-17 NOTE — EKG
Date Performed: 06/16/2018       Time Performed: 13:18:53

 

PTAGE:      88 years

 

EKG:      SINUS TACHYCARDIA WITH FIRST DEGREE AV BLOCK MARKED LEFT AXIS DEVIATION NONSPECIFIC T-WAVE 
ABNORMALITY ABNORMAL ECG 

 

NO PREVIOUS TRACING            

 

DOCTOR:   Kwame Alvarez  Interpretating Date/Time  06/17/2018 13:47:58

## 2018-06-18 VITALS
OXYGEN SATURATION: 97 % | HEART RATE: 96 BPM | RESPIRATION RATE: 24 BRPM | TEMPERATURE: 99.2 F | DIASTOLIC BLOOD PRESSURE: 68 MMHG | SYSTOLIC BLOOD PRESSURE: 168 MMHG

## 2018-06-18 VITALS
TEMPERATURE: 97.6 F | DIASTOLIC BLOOD PRESSURE: 62 MMHG | OXYGEN SATURATION: 95 % | HEART RATE: 82 BPM | SYSTOLIC BLOOD PRESSURE: 129 MMHG | RESPIRATION RATE: 21 BRPM

## 2018-06-18 VITALS — HEART RATE: 91 BPM

## 2018-06-18 VITALS
OXYGEN SATURATION: 95 % | DIASTOLIC BLOOD PRESSURE: 70 MMHG | HEART RATE: 78 BPM | RESPIRATION RATE: 22 BRPM | SYSTOLIC BLOOD PRESSURE: 130 MMHG

## 2018-06-18 VITALS — HEART RATE: 86 BPM

## 2018-06-18 VITALS
SYSTOLIC BLOOD PRESSURE: 160 MMHG | RESPIRATION RATE: 23 BRPM | TEMPERATURE: 98.1 F | HEART RATE: 94 BPM | DIASTOLIC BLOOD PRESSURE: 77 MMHG | OXYGEN SATURATION: 96 %

## 2018-06-18 VITALS — HEART RATE: 85 BPM

## 2018-06-18 VITALS — HEART RATE: 84 BPM

## 2018-06-18 VITALS — OXYGEN SATURATION: 96 %

## 2018-06-18 VITALS — HEART RATE: 90 BPM

## 2018-06-18 VITALS — HEART RATE: 88 BPM

## 2018-06-18 VITALS — HEART RATE: 92 BPM

## 2018-06-18 VITALS — OXYGEN SATURATION: 95 %

## 2018-06-18 LAB
BASOPHILS # BLD AUTO: 0.1 TH/MM3 (ref 0–0.2)
BASOPHILS NFR BLD: 0.6 % (ref 0–2)
BUN SERPL-MCNC: 25 MG/DL (ref 7–18)
CALCIUM SERPL-MCNC: 7.6 MG/DL (ref 8.5–10.1)
CHLORIDE SERPL-SCNC: 110 MEQ/L (ref 98–107)
CREAT SERPL-MCNC: 1.1 MG/DL (ref 0.5–1)
EOSINOPHIL # BLD: 0.4 TH/MM3 (ref 0–0.4)
EOSINOPHIL NFR BLD: 4.4 % (ref 0–4)
ERYTHROCYTE [DISTWIDTH] IN BLOOD BY AUTOMATED COUNT: 17.1 % (ref 11.6–17.2)
GFR SERPLBLD BASED ON 1.73 SQ M-ARVRAT: 47 ML/MIN (ref 89–?)
GLUCOSE SERPL-MCNC: 86 MG/DL (ref 74–106)
HCO3 BLD-SCNC: 22.3 MEQ/L (ref 21–32)
HCT VFR BLD CALC: 28 % (ref 35–46)
HGB BLD-MCNC: 9.3 GM/DL (ref 11.6–15.3)
LYMPHOCYTES # BLD AUTO: 1.2 TH/MM3 (ref 1–4.8)
LYMPHOCYTES NFR BLD AUTO: 13.8 % (ref 9–44)
MCH RBC QN AUTO: 30.9 PG (ref 27–34)
MCHC RBC AUTO-ENTMCNC: 33.2 % (ref 32–36)
MCV RBC AUTO: 93.1 FL (ref 80–100)
MONOCYTE #: 1.6 TH/MM3 (ref 0–0.9)
MONOCYTES NFR BLD: 17.8 % (ref 0–8)
NEUTROPHILS # BLD AUTO: 5.4 TH/MM3 (ref 1.8–7.7)
NEUTROPHILS NFR BLD AUTO: 63.4 % (ref 16–70)
PLATELET # BLD: 205 TH/MM3 (ref 150–450)
PMV BLD AUTO: 8 FL (ref 7–11)
RBC # BLD AUTO: 3.01 MIL/MM3 (ref 4–5.3)
SODIUM SERPL-SCNC: 140 MEQ/L (ref 136–145)
WBC # BLD AUTO: 8.7 TH/MM3 (ref 4–11)

## 2018-06-18 RX ADMIN — METOPROLOL TARTRATE SCH MG: 25 TABLET, FILM COATED ORAL at 20:34

## 2018-06-18 RX ADMIN — STANDARDIZED SENNA CONCENTRATE AND DOCUSATE SODIUM SCH TAB: 8.6; 5 TABLET, FILM COATED ORAL at 20:34

## 2018-06-18 RX ADMIN — Medication SCH ML: at 10:08

## 2018-06-18 RX ADMIN — TAZOBACTAM SODIUM AND PIPERACILLIN SODIUM SCH MLS/HR: 250; 2 INJECTION, SOLUTION INTRAVENOUS at 10:08

## 2018-06-18 RX ADMIN — TAZOBACTAM SODIUM AND PIPERACILLIN SODIUM SCH MLS/HR: 250; 2 INJECTION, SOLUTION INTRAVENOUS at 02:16

## 2018-06-18 RX ADMIN — Medication SCH ML: at 20:35

## 2018-06-18 RX ADMIN — THIAMINE HYDROCHLORIDE SCH MLS/HR: 100 INJECTION, SOLUTION INTRAMUSCULAR; INTRAVENOUS at 08:33

## 2018-06-18 RX ADMIN — TAZOBACTAM SODIUM AND PIPERACILLIN SODIUM SCH MLS/HR: 250; 2 INJECTION, SOLUTION INTRAVENOUS at 15:18

## 2018-06-18 RX ADMIN — STANDARDIZED SENNA CONCENTRATE AND DOCUSATE SODIUM SCH TAB: 8.6; 5 TABLET, FILM COATED ORAL at 10:08

## 2018-06-18 RX ADMIN — SODIUM CHLORIDE SCH MLS/HR: 900 INJECTION, SOLUTION INTRAVENOUS at 10:07

## 2018-06-18 RX ADMIN — TAZOBACTAM SODIUM AND PIPERACILLIN SODIUM SCH MLS/HR: 250; 2 INJECTION, SOLUTION INTRAVENOUS at 20:33

## 2018-06-18 NOTE — HHI.PR
Subjective


Remarks


Patient seen and evaluated in follow-up for sepsis with urinary tract 

infection.  Doing better.  Blood pressure improved.  Hemoglobin improved after 

blood transfusion.  Patient requiring IV iron due to severe anemia iron 

deficiency


Discussed with patient and son at bedside





Objective


Vitals





Vital Signs








  Date Time  Temp Pulse Resp B/P (MAP) Pulse Ox O2 Delivery O2 Flow Rate FiO2


 


6/18/18 07:30     96 Nasal Cannula 2.00 


 


6/18/18 06:00  84      


 


6/18/18 05:00  85      


 


6/18/18 04:00  89      


 


6/18/18 04:00 97.6 82 21 129/62 (84) 95   


 


6/18/18 03:00  85      


 


6/18/18 02:00  86      


 


6/18/18 01:00  86      


 


6/18/18 00:00  96      


 


6/18/18 00:00 99.2 96 24 168/68 (101) 97   


 


6/17/18 23:30  96 22 181/100 (127) 100   


 


6/17/18 23:00  83      


 


6/17/18 22:00  93      


 


6/17/18 21:02  94 29 154/59 (90)    


 


6/17/18 21:00  93      


 


6/17/18 20:45  86 25 137/64 (88) 100   


 


6/17/18 20:30     100 Nasal Cannula 2.00 


 


6/17/18 20:30  84 25 136/61 (86) 100   


 


6/17/18 20:15  84 23 129/57 (81) 100   


 


6/17/18 20:00 98.9 84 24 134/60 (84) 100   


 


6/17/18 20:00  96      


 


6/17/18 19:30  86 25 121/59 (79) 100   


 


6/17/18 19:15  86 23 115/56 (75) 100   


 


6/17/18 19:00  84 30 115/57 (76) 100   


 


6/17/18 19:00  64      


 


6/17/18 18:04  91      


 


6/17/18 17:00  90      


 


6/17/18 16:30  80 27 117/61 (79) 100   


 


6/17/18 16:15  78 21 111/53 (72) 100   


 


6/17/18 16:00  78 23 109/52 (71) 100   


 


6/17/18 16:00  93      


 


6/17/18 15:45  78 20 110/55 (73) 100   


 


6/17/18 15:30  76 19 109/52 (71) 100   


 


6/17/18 15:15  80 21 113/52 (72) 100   


 


6/17/18 15:00  80      


 


6/17/18 15:00  76 20 103/50 (67) 100   


 


6/17/18 14:45  76 22 107/51 (69) 100   


 


6/17/18 14:30  76 18 104/48 (66) 100   


 


6/17/18 14:15  76 21 87/43 (58) 100   


 


6/17/18 14:10  77      


 


6/17/18 14:00  78 16 102/49 (66) 100   


 


6/17/18 13:59 98.3 77 22 102/49 100   


 


6/17/18 13:49  78 27 94/48 (63) 100   


 


6/17/18 13:44 98.2 78 25 94/48    


 


6/17/18 13:00  76      


 


6/17/18 13:00  84 26 89/44 (59) 100   


 


6/17/18 12:24  88      


 


6/17/18 12:00  80 26 98/50 (66)    


 


6/17/18 12:00  80 26 98/50 (66)    


 


6/17/18 11:00  86      


 


6/17/18 11:00  78 27 93/55 (68) 100   














I/O      


 


 6/17/18 6/17/18 6/17/18 6/18/18 6/18/18 6/18/18





 07:00 15:00 23:00 07:00 15:00 23:00


 


Intake Total 3967 ml 1153 ml 730 ml 240 ml  


 


Output Total 725 ml 551 ml 950 ml 1300 ml  


 


Balance 3242 ml 602 ml -220 ml -1060 ml  


 


      


 


Intake Oral 480 ml 480 ml 200 ml 240 ml  


 


IV Total 3487 ml 653 ml 130 ml   


 


Packed Cells   400 ml   


 


Blood Product IV Normal Saline Flush  20 ml    


 


Output Urine Total 725 ml 550 ml 950 ml 1300 ml  


 


Stool Total  1 ml    


 


# Bowel Movements 0   0  








Result Diagram:  


6/18/18 0445 6/18/18 0443





Objective Remarks


GENERAL: This is a well-nourished, well-developed patient, in no apparent 

distress.


CARDIOVASCULAR: Regular rate and rhythm with systolic murmur


RESPIRATORY: Clear to auscultation. Breath sounds equal bilaterally. No wheezes

, rales, or rhonchi.  


GASTROINTESTINAL: Abdomen soft, non-tender, nondistended. Normal active bowel 

sounds


MUSCULOSKELETAL: Extremities without clubbing, cyanosis, or edema.


NEURO:  Alert & Oriented x4 to person, place, time, situation.  Moves all ext x4





A/P


Problem List:  


(1) Severe sepsis


ICD Code:  A41.9 - Sepsis, unspecified organism; R65.20 - Severe sepsis without 

septic shock


Status:  Acute


Plan:  Secondary to urinary tract infection with gram-negative johnny


Leukocytosis and hypotension improved


Continue Zosyn





(2) UTI (urinary tract infection)


ICD Code:  N39.0 - Urinary tract infection, site not specified


Status:  Acute


Plan:  Gram-negative johnny, sensitivities and identification pending


Continue empiric Zosyn





(3) Renal failure


ICD Code:  N19 - Unspecified kidney failure


Status:  Acute


(4) Anemia


ICD Code:  D64.9 - Anemia, unspecified


Status:  Acute


Plan:  Severe iron deficiency anemia


IV iron x2/3d


Patient denies any intestinal bleeding


Hemoglobin improved and hypotension better patient admits increased fatigue 

over the last several months outpatient endoscopy recommended family and 

patient agreed





(5) Murmur, cardiac


ICD Code:  R01.1 - Cardiac murmur, unspecified


Plan:  Patient with a history of coronary disease, follow-up echocardiogram


Follow-up blood cultures


Patient does not recall a murmur in her medical history





Discharge Planning


MedSurg unit


Out of bed ad navdeep.


Likely discharge in a.m. on oral antibiotics pending cultures





Problem Qualifiers





(1) UTI (urinary tract infection):  


Qualified Codes:  N39.0 - Urinary tract infection, site not specified


(2) Renal failure:  


Qualified Codes:  N17.9 - Acute kidney failure, unspecified


(3) Anemia:  


Qualified Codes:  D64.9 - Anemia, unspecified








Lori Freed MD Jun 18, 2018 11:00

## 2018-06-18 NOTE — ECHRPT
Indication:   SHORTNESS OF BREATH 

 

 CONCLUSIONS 

 The left ventricular systolic function is normal with an estimated ejection fraction in the range of
 60-65%.  

 Doppler parameters are consistent with impaired left ventricular relaxtion (grade 1 diastolic dysfun
ction).  

 Moderate mitral valve regurgitation.  

 Mild to moderate aortic valve stenosis ( peak grad 39, mean grad 18, LORETA 0.5 which may be falsely lo
w  

 based on LVOT measurements) 

 Mild aortic valve regurgitation.  

 There is mild to moderate tricuspid valve regurgitation.  

 There is estimated moderate pulmonary hypertension present (range 50-60 mmHg).  

 

 

 

 

 BP:  129   / 62      HR: 89                       Rhythm:           Sinus 

 

 MEASUREMENTS  (Male / Female) Normal Values       Technical Quality:Fair 

 2D ECHO 

 LV Diastolic Diameter PLAX        3.9 cm                4.2 - 5.9 / 3.9 - 5.3 cm 

 LV Systolic Diameter PLAX         2.8 cm                 

 IVS Diastolic Thickness           1.4 cm                0.6 - 1.0 / 0.6 - 0.9 cm 

 LVPW Diastolic Thickness          1.4 cm                0.6 - 1.0 / 0.6 - 0.9 cm 

 LV Relative Wall Thickness        0.7                    

 RV Internal Dim ED PLAX           2.7 cm                 

 LVOT Diameter                     1.4 cm                 

 Aortic Root Diameter              3.9 cm                 

 LA Systolic Diameter LX           4.2 cm                3.0 - 4.0 / 2.7 - 3.8 cm 

 

 DOPPLER 

 AV Peak Velocity                  290.5 cm/s             

 AV Peak Gradient                  33.8 mmHg              

 AV Mean Gradient                  17.0 mmHg              

 AV Velocity Time Integral         58.2 cm                

 LVOT Peak Velocity                88.8 cm/s              

 LVOT Peak Gradient                3.2 mmHg               

 LVOT Velocity Time Integral       17.8 cm                

 AV Area Cont Eq vti               0.5 cm                

 AV Area Cont Eq pk                0.5 cm                

 Mitral E Point Velocity           148.0 cm/s             

 Mitral A Point Velocity           175.0 cm/s             

 Mitral E to A Ratio               0.8                    

 LV E' Lateral Velocity            10.6 cm/s              

 Mitral E to LV E' Lateral Ratio   14.0                   

 LV E' Septal Velocity             6.8 cm/s               

 Mitral E to LV E' Septal Ratio    21.7                   

 TR Peak Velocity                  323.0 cm/s             

 TR Peak Gradient                  41.7 mmHg              

 Right Atrial Pressure             10.0 mmHg              

 Pulmonary Artery Systolic Pressu  51.7 mmHg              

 Right Ventricular Systolic Press  51.7 mmHg              

 PV Peak Velocity                  32.3 cm/s              

 PV Peak Gradient                  0.4 mmHg               

 

 

 FINDINGS 

 

 LEFT VENTRICLE 

 Normal left ventricular size.  

 Mild concentric left ventricular hypertrophy.  

 The left ventricular systolic function is normal with an estimated ejection fraction in the range of
 60-65%.  

 Doppler parameters are consistent with impaired left ventricular relaxtion (grade 1 diastolic dysfun
ction).  

 

 RIGHT VENTRICLE 

 Normal right ventricular size and systolic function.  

 

 LEFT ATRIUM 

 The left atrial size is mildly dilated.  

 

 RIGHT ATRIUM 

 The right atrial size is mild-to-moderately dilated.  

 

 ATRIAL SEPTUM 

 No atrial level shunt is demonstrated by color flow Doppler interrogation.  

 

 AORTA 

 Aortic root mild dilated at 3.9cm. 

 

 MITRAL VALVE 

 Moderate mitral annular calcification.  

 No mitral valve stenosis.  

 Moderate mitral valve regurgitation.  

 

 AORTIC VALVE 

 Aortic valve sclerosis is present.  

 Diffuse calcification of the aortic valve.  

 Mild aortic valve regurgitation.  

 Mild to moderate aortic valve stenosis ( peak grad 39, mean grad 18, LORETA 0.5 which may be falsely lo
w  

 based on LVOT measurements) 

 

 TRICUSPID VALVE 

 There is mild to moderate tricuspid valve regurgitation.  

 There is estimated moderate pulmonary hypertension present (range 50-60 mmHg).  

 The estimated pulmonary arterial pressure is 51.7 mmHg.  

 

 PULMONARY VALVE 

 No pulmonary valve regurgitation or stenosis.  

 

 VESSELS 

 The inferior vena cava is normal in size.  

 

 PERICARDIUM 

 No pericardial effusion.  

 

 

 

 

  Raimundo Keith DO 

  (Electronically Signed) 

  Final Date:18 June 2018 16:35

## 2018-06-19 VITALS — OXYGEN SATURATION: 96 % | HEART RATE: 78 BPM | RESPIRATION RATE: 25 BRPM

## 2018-06-19 VITALS
DIASTOLIC BLOOD PRESSURE: 49 MMHG | HEART RATE: 86 BPM | OXYGEN SATURATION: 97 % | SYSTOLIC BLOOD PRESSURE: 112 MMHG | RESPIRATION RATE: 26 BRPM

## 2018-06-19 VITALS — HEART RATE: 82 BPM | OXYGEN SATURATION: 95 % | RESPIRATION RATE: 27 BRPM

## 2018-06-19 VITALS — HEART RATE: 78 BPM | RESPIRATION RATE: 25 BRPM

## 2018-06-19 VITALS — HEART RATE: 82 BPM

## 2018-06-19 VITALS — HEART RATE: 85 BPM

## 2018-06-19 VITALS — TEMPERATURE: 98.4 F | HEART RATE: 84 BPM | RESPIRATION RATE: 23 BRPM

## 2018-06-19 VITALS — HEART RATE: 80 BPM | RESPIRATION RATE: 29 BRPM | OXYGEN SATURATION: 97 % | TEMPERATURE: 98.2 F

## 2018-06-19 VITALS — SYSTOLIC BLOOD PRESSURE: 170 MMHG | DIASTOLIC BLOOD PRESSURE: 82 MMHG

## 2018-06-19 VITALS — HEART RATE: 86 BPM

## 2018-06-19 VITALS
HEART RATE: 88 BPM | OXYGEN SATURATION: 96 % | DIASTOLIC BLOOD PRESSURE: 54 MMHG | SYSTOLIC BLOOD PRESSURE: 120 MMHG | RESPIRATION RATE: 28 BRPM

## 2018-06-19 VITALS — HEART RATE: 80 BPM

## 2018-06-19 VITALS — HEART RATE: 92 BPM | RESPIRATION RATE: 18 BRPM

## 2018-06-19 VITALS — HEART RATE: 87 BPM

## 2018-06-19 VITALS — HEART RATE: 100 BPM | RESPIRATION RATE: 33 BRPM | DIASTOLIC BLOOD PRESSURE: 71 MMHG | SYSTOLIC BLOOD PRESSURE: 169 MMHG

## 2018-06-19 VITALS — HEART RATE: 79 BPM

## 2018-06-19 VITALS — HEART RATE: 89 BPM

## 2018-06-19 VITALS — OXYGEN SATURATION: 98 %

## 2018-06-19 VITALS — HEART RATE: 88 BPM

## 2018-06-19 RX ADMIN — STANDARDIZED SENNA CONCENTRATE AND DOCUSATE SODIUM SCH TAB: 8.6; 5 TABLET, FILM COATED ORAL at 08:29

## 2018-06-19 RX ADMIN — Medication SCH ML: at 08:30

## 2018-06-19 RX ADMIN — METOPROLOL TARTRATE SCH MG: 25 TABLET, FILM COATED ORAL at 08:30

## 2018-06-19 RX ADMIN — TAZOBACTAM SODIUM AND PIPERACILLIN SODIUM SCH MLS/HR: 250; 2 INJECTION, SOLUTION INTRAVENOUS at 09:14

## 2018-06-19 RX ADMIN — SODIUM CHLORIDE SCH MLS/HR: 900 INJECTION, SOLUTION INTRAVENOUS at 09:58

## 2018-06-19 RX ADMIN — TAZOBACTAM SODIUM AND PIPERACILLIN SODIUM SCH MLS/HR: 250; 2 INJECTION, SOLUTION INTRAVENOUS at 14:00

## 2018-06-19 RX ADMIN — TAZOBACTAM SODIUM AND PIPERACILLIN SODIUM SCH MLS/HR: 250; 2 INJECTION, SOLUTION INTRAVENOUS at 02:16

## 2018-06-19 NOTE — HHI.DCPOC
Discharge Care Plan


Diagnosis:  


(1) Severe sepsis


(2) UTI (urinary tract infection)


(3) Renal failure


(4) Anemia


(5) Murmur, cardiac


Goals to Promote Your Health


* To prevent worsening of your condition and complications


* To maintain your health at the optimal level


Directions to Meet Your Goals


*** Take your medications as prescribed


*** Follow your dietary instruction


*** Follow activity as directed








*** Keep your appointments as scheduled


*** Take your immunizations and boosters as scheduled


*** If your symptoms worsen call your PCP, if no PCP go to Urgent Care Center 

or Emergency Room***


*** Smoking is Dangerous to Your Health. Avoid second hand smoke***


***Call the 24-hour hour crisis hotline for domestic abuse at 1-280.210.8733***











Js Whitman Jun 19, 2018 13:59

## 2018-06-19 NOTE — HHI.DS
__________________________________________________





Discharge Summary


Admission Date


Jun 18, 2018 at 10:59


Discharge Date:  Jun 19, 2018


Admitting Diagnosis


Sepsis, uti, anemia





(1) Severe sepsis


ICD Code:  A41.9 - Sepsis, unspecified organism; R65.20 - Severe sepsis without 

septic shock


Status:  Acute


(2) UTI (urinary tract infection)


ICD Code:  N39.0 - Urinary tract infection, site not specified


Status:  Acute


(3) Renal failure


ICD Code:  N19 - Unspecified kidney failure


Status:  Acute


(4) Anemia


ICD Code:  D64.9 - Anemia, unspecified


Status:  Acute


(5) Murmur, cardiac


ICD Code:  R01.1 - Cardiac murmur, unspecified


Procedures


ECHOCARDIOGRAM


CONCLUSIONS 


 The left ventricular systolic function is normal with an estimated ejection 

fraction in the range of 60-65%.  


 Doppler parameters are consistent with impaired left ventricular relaxtion (

grade 1 diastolic dysfunction).  


 Moderate mitral valve regurgitation.  


 Mild to moderate aortic valve stenosis ( peak grad 39, mean grad 18, LORETA 0.5 

which may be falsely low  


 based on LVOT measurements) 


 Mild aortic valve regurgitation.  


 There is mild to moderate tricuspid valve regurgitation.  


 There is estimated moderate pulmonary hypertension present (range 50-60 mmHg).


Brief History - From Admission


This is a pleasant 88-year-old female patient visiting her son from [place] 

who presented to the ED with generalized weakness.  Patient states that she 

woke up this morning, had presence of chills and violent rigors, subjective 

fever, decreased appetite, nausea, vomiting and generalized weakness.  Son at 

bedside and assisting with medical history.  Supposedly patient was in the 

bathroom this morning, had one bout of emesis as well as inability to stand up 

from toilet due to weakness in her legs.  Prior to this morning's episodes 

patient has been feeling at her baseline, completely independent and able to 

perform all ADLs per self.  Patient does admit to poor appetite today and 

unable to keep anything down without nausea.  Patient denies any recent dysuria

, diarrhea, bloody stools or black stools.  Does follow with the primary care 

physician as well as a cardiologist back home in [place], last seen within the 

month with no changes to her medicines.  Upon presentation patient's hemoglobin 

9.0, patient denies any history of anemia although she vaguely remembers that 

she may have "low blood levels in the past".  Does admit to a history of E. 

coli in her stool in the past.


CBC/BMP:  


6/18/18 0445                                                                   

             6/18/18 0443





Significant Findings





Laboratory Tests








Test


  6/17/18


05:45 6/18/18


04:43 6/18/18


04:45


 


Red Blood Count


  2.35 MIL/MM3


(4.00-5.30) 


  3.01 MIL/MM3


(4.00-5.30)


 


Hemoglobin


  7.6 GM/DL


(11.6-15.3) 


  9.3 GM/DL


(11.6-15.3)


 


Hematocrit


  22.3 %


(35.0-46.0) 


  28.0 %


(35.0-46.0)


 


Neutrophils (%) (Auto)


  73.7 %


(16.0-70.0) 


  


 


 


Monocytes (%) (Auto)


  12.8 %


(0.0-8.0) 


  17.8 %


(0.0-8.0)


 


Monocytes # (Auto)


  1.3 TH/MM3


(0-0.9) 


  1.6 TH/MM3


(0-0.9)


 


Blood Urea Nitrogen


  32 MG/DL


(7-18) 25 MG/DL


(7-18) 


 


 


Creatinine


  1.30 MG/DL


(0.50-1.00) 1.10 MG/DL


(0.50-1.00) 


 


 


Total Protein


  5.6 GM/DL


(6.4-8.2) 


  


 


 


Albumin


  2.6 GM/DL


(3.4-5.0) 


  


 


 


Calcium Level


  7.5 MG/DL


(8.5-10.1) 7.6 MG/DL


(8.5-10.1) 


 


 


Aspartate Amino Transf


(AST/SGOT) 71 U/L (15-37) 


  


  


 


 


Alanine Aminotransferase


(ALT/SGPT) 66 U/L (10-53) 


  


  


 


 


Chloride Level


  109 MEQ/L


() 110 MEQ/L


() 


 


 


Estimat Glomerular Filtration


Rate 39 ML/MIN


(>89) 47 ML/MIN


(>89) 


 


 


Eosinophils (%) (Auto)


  


  


  4.4 %


(0.0-4.0)








Imaging





Last Impressions








Renal Ultrasound 6/17/18 0000 Signed





Impressions: 





 CONCLUSION: 





 1.  Bilateral renal cysts. No solid lesions or hydronephrosis.





  





 


 


Chest X-Ray 6/16/18 1311 Signed





Impressions: 





 CONCLUSION: 





 Cardiomegaly with minimal basilar atelectasis or scarring. No effusion. No pneu





 mothorax.





  





 


 


Abdomen/Pelvis CT 6/16/18 0000 Signed





Impressions: 





 CONCLUSION:





 1.  No acute findings. Colonic diverticulosis. Advanced degenerative changes in





  the spine. No obstructive uropathy or bowel obstruction identified. Previous b





 ilateral hip replacement.





  





 








PE at Discharge


GENERAL: Well-developed, well-nourished, in no acute distress. alert and 

orientated


HEENT: Head is normocephalic without any lesions or masses noted. Facial 

features are symmetric. Eyes: Extraocular muscles are intact. Conjunctivae were 

clear.


NECK: Supple without any masses. Trachea midline no deviation. No JVD,


CARDIAC: Regular rhythm, regular rate. S1/S2 are heard.  2/6 ejection murmur, 

no gallops or rubs.


LUNGS: Clear to auscultation bilaterally. No wheeze, rhonchi or rales. No use 

of accessory muscles on inspiration or expiration.


ABDOMEN: Soft, nontender. Nondistended. Bowel sounds heard in all 4 quadrants. 

No organomegaly or masses. Negative rebound, negative guarding


EXTREMITIES: No edema, pulses are equal bilaterally. No cyanosis or clubbing


NEUROLOGY: Mood and affect appear appropriate. Cranial nerves II through XII 

grossly intact.  Moving all extremities, speech is clear


Hospital Course


88-year-old female who is down here visiting her son from [place] presented to 

the emergency department with generalized weakness.  Patient presented with 

chills, rigors, subjective fever, decreased appetite, nausea vomiting and 

generalized weakness.  Patient was found to have multiple medical problems with 

sepsis on presentation with urinary tract infection.  Patient was admitted to 

the ICU secondary to critical care management.  Patient did have significant 

hypotension which required 3 L of saline boluses to maintain blood pressure.  

Patient did present with a blood pressure of 84/38.  Patient was started on 

empirical antibiotics to include vancomycin and Zosyn.  Patient was pancultured 

with blood cultures remained negative for 3 days, urine culture did grow E. 

coli.  Patient did have signs of acute anemia in which she had workup performed 

which did indicate iron deficiency anemia.  Patient was given IV iron during 

her stay as well as transfused 1 unit packed red blood cells with improvement 

of her blood pressure.  Patient did undergo echocardiogram to evaluate cardiac 

function secondary to her significant hypotension.  Ejection fraction was 60-65%

.  Did have mild to moderate aortic valve stenosis.  Moderate pulmonary 

hypertension.  Patient did tolerate treatment well.  She did undergo physical 

therapy during her stay in the hospital.  Who indicated that she could benefit 

from outpatient physical therapy if needed.  Would benefit from a walker at 

home.  Son has required a walker at this time.  Patient is clinically stable 

this time.  She responded well to medical management.  Cultures did return and 

her urinary tract infection is pansensitive.  Patient was discharged home on 

Bactrim for continued management.  Patient does indicate she has a follow-up 

with her cardiologist in [place] in 1 week.  I notified her that she needs to 

continue monitor her aortic valve stenosis for further plans and care.  We will 

plan discharge accordingly.


Pt Condition on Discharge:  Stable


Discharge Disposition:  Discharge Home


Discharge Time:  > 30 minutes


Discharge Instructions


DIET: Follow Instructions for:  Heart Healthy Diet


Activities you can perform:  Regular-No Restrictions


Follow up Referrals:  


PCP Follow-up - 1 Week





New Medications:  


Sulfamethoxazole-Trimethoprim (Bactrim DS) 800-160 Mg Tab


1 TAB PO BID for Infection, #20 TAB 0 Refills





 


Continued Medications:  


Aspirin (Aspirin) 81 Mg Chew


Unknown Dose CHEW DAILY, TAB 0 Refills





Furosemide (Furosemide) 20 Mg Tab


PO DAILY, #60 TAB 0 Refills





Metoprolol Tartrate (Metoprolol Tartrate) 25 Mg Tab


Unknown Dose PO BID, #60 TAB 0 Refills





Simvastatin (Simvastatin) 5 Mg Tab


Unknown Dose PO DAILY for Cholesterol Management, #30 TAB 0 Refills





Tramadol (Tramadol) 50 Mg Tab


Unknown Dose PO Q4H PRN for PAIN, TAB 0 Refills

















Js Whitman Jun 19, 2018 16:55

## 2018-06-19 NOTE — HHI.FF
Face to Face Verification


Diagnosis:  


(1) Severe sepsis


(2) UTI (urinary tract infection)


(3) Renal failure


(4) Anemia


Physical Therapy


Order:  Evaluate and Treat, Improve ambulation, Strength and gait training





Home Health Nursing








Order: Medical education





 Signs/symptoms of disease process





 Nursing assessment with vital signs

















I have seen patient Jessie Mack on 6/19/18. My clinical findings support the 

need for the requested home health care services because:








 Deconditioned w/ increased weakness





 Limited ability to care for self














I certify that my clinical findings support that this patient is homebound 

because:








 Unsteady gait/balance

















Js Whitman Jun 19, 2018 14:05

## 2020-07-16 ENCOUNTER — APPOINTMENT (RX ONLY)
Dept: URBAN - METROPOLITAN AREA CLINIC 84 | Facility: CLINIC | Age: 85
Setting detail: DERMATOLOGY
End: 2020-07-16

## 2020-07-16 VITALS — TEMPERATURE: 96.4 F

## 2020-07-16 DIAGNOSIS — L57.0 ACTINIC KERATOSIS: ICD-10-CM

## 2020-07-16 DIAGNOSIS — L82.1 OTHER SEBORRHEIC KERATOSIS: ICD-10-CM

## 2020-07-16 DIAGNOSIS — L81.4 OTHER MELANIN HYPERPIGMENTATION: ICD-10-CM

## 2020-07-16 DIAGNOSIS — D22 MELANOCYTIC NEVI: ICD-10-CM

## 2020-07-16 DIAGNOSIS — Z85.828 PERSONAL HISTORY OF OTHER MALIGNANT NEOPLASM OF SKIN: ICD-10-CM

## 2020-07-16 DIAGNOSIS — L57.8 OTHER SKIN CHANGES DUE TO CHRONIC EXPOSURE TO NONIONIZING RADIATION: ICD-10-CM

## 2020-07-16 PROBLEM — D22.5 MELANOCYTIC NEVI OF TRUNK: Status: ACTIVE | Noted: 2020-07-16

## 2020-07-16 PROCEDURE — ? FULL BODY SKIN EXAM

## 2020-07-16 PROCEDURE — ? ADDITIONAL NOTES

## 2020-07-16 PROCEDURE — 17003 DESTRUCT PREMALG LES 2-14: CPT

## 2020-07-16 PROCEDURE — 99202 OFFICE O/P NEW SF 15 MIN: CPT | Mod: 25

## 2020-07-16 PROCEDURE — ? LIQUID NITROGEN

## 2020-07-16 PROCEDURE — 17000 DESTRUCT PREMALG LESION: CPT

## 2020-07-16 PROCEDURE — ? COUNSELING

## 2020-07-16 ASSESSMENT — LOCATION DETAILED DESCRIPTION DERM
LOCATION DETAILED: NASAL DORSUM
LOCATION DETAILED: STERNUM
LOCATION DETAILED: EPIGASTRIC SKIN
LOCATION DETAILED: RIGHT CENTRAL MALAR CHEEK
LOCATION DETAILED: LEFT SUPERIOR HELIX
LOCATION DETAILED: PERIUMBILICAL SKIN
LOCATION DETAILED: RIGHT SUPERIOR CRUS OF ANTIHELIX

## 2020-07-16 ASSESSMENT — LOCATION SIMPLE DESCRIPTION DERM
LOCATION SIMPLE: RIGHT EAR
LOCATION SIMPLE: CHEST
LOCATION SIMPLE: RIGHT CHEEK
LOCATION SIMPLE: LEFT EAR
LOCATION SIMPLE: ABDOMEN
LOCATION SIMPLE: NOSE

## 2020-07-16 ASSESSMENT — LOCATION ZONE DERM
LOCATION ZONE: EAR
LOCATION ZONE: NOSE
LOCATION ZONE: TRUNK
LOCATION ZONE: FACE

## 2020-07-16 NOTE — PROCEDURE: LIQUID NITROGEN

## 2020-07-16 NOTE — PROCEDURE: ADDITIONAL NOTES
Detail Level: Simple
Additional Notes: Patient consent was obtained to proceed with the visit and recommended plan of care after discussion of all risks and benefits, including the risks of COVID-19 exposure.
Additional Notes: annual checks recommended